# Patient Record
Sex: MALE | Race: WHITE
[De-identification: names, ages, dates, MRNs, and addresses within clinical notes are randomized per-mention and may not be internally consistent; named-entity substitution may affect disease eponyms.]

---

## 2022-04-04 PROBLEM — Z00.00 ENCOUNTER FOR PREVENTIVE HEALTH EXAMINATION: Status: ACTIVE | Noted: 2022-04-04

## 2022-04-05 ENCOUNTER — APPOINTMENT (OUTPATIENT)
Dept: ORTHOPEDIC SURGERY | Facility: CLINIC | Age: 62
End: 2022-04-05
Payer: OTHER MISCELLANEOUS

## 2022-04-05 VITALS — WEIGHT: 230 LBS | HEIGHT: 67 IN | BODY MASS INDEX: 36.1 KG/M2

## 2022-04-05 DIAGNOSIS — G40.909 EPILEPSY, UNSPECIFIED, NOT INTRACTABLE, W/OUT STATUS EPILEPTICUS: ICD-10-CM

## 2022-04-05 DIAGNOSIS — E78.00 PURE HYPERCHOLESTEROLEMIA, UNSPECIFIED: ICD-10-CM

## 2022-04-05 PROCEDURE — 20611 DRAIN/INJ JOINT/BURSA W/US: CPT | Mod: RT

## 2022-04-05 PROCEDURE — 99072 ADDL SUPL MATRL&STAF TM PHE: CPT

## 2022-04-05 RX ORDER — ATORVASTATIN CALCIUM 80 MG/1
TABLET, FILM COATED ORAL
Refills: 0 | Status: ACTIVE | COMMUNITY

## 2022-04-05 RX ORDER — LEVETIRACETAM 1000 MG/1
TABLET, FILM COATED ORAL
Refills: 0 | Status: ACTIVE | COMMUNITY

## 2022-04-05 RX ORDER — OLMESARTAN MEDOXOMIL 40 MG/1
TABLET, FILM COATED ORAL
Refills: 0 | Status: ACTIVE | COMMUNITY

## 2022-04-05 NOTE — HISTORY OF PRESENT ILLNESS
[de-identified] : Rt knee follow up  DOI 11/17/21. Starting series of Orthovisc possibly in the knee today. Still complaining of some pain with motion and getting up and down. CUrrently not working

## 2022-04-05 NOTE — DISCUSSION/SUMMARY
[de-identified] : rt knee post treauamtic arthitis  disuscced visco and would move forward w starting orthovisc today \par Patient presented for followup, exam is unchanged and doing well, evaluated and  no contraindication to proceed with orthovisc for arthritis, reviewed  future non surgical and surgical options for today and in future\par

## 2022-04-05 NOTE — PROCEDURE
[Large Joint Injection] : Large joint injection [Right] : of the right [Knee] : knee [Orthovisc] : Orthovisc [Inflammation] : inflammation

## 2022-04-12 ENCOUNTER — APPOINTMENT (OUTPATIENT)
Dept: ORTHOPEDIC SURGERY | Facility: CLINIC | Age: 62
End: 2022-04-12
Payer: OTHER MISCELLANEOUS

## 2022-04-12 VITALS — BODY MASS INDEX: 36.1 KG/M2 | HEIGHT: 67 IN | WEIGHT: 230 LBS

## 2022-04-12 DIAGNOSIS — Z86.79 PERSONAL HISTORY OF OTHER DISEASES OF THE CIRCULATORY SYSTEM: ICD-10-CM

## 2022-04-12 PROCEDURE — 99072 ADDL SUPL MATRL&STAF TM PHE: CPT

## 2022-04-12 PROCEDURE — 99212 OFFICE O/P EST SF 10 MIN: CPT | Mod: 25

## 2022-04-12 PROCEDURE — 20611 DRAIN/INJ JOINT/BURSA W/US: CPT | Mod: RT

## 2022-04-12 NOTE — PROCEDURE
[Right] : of the right [Knee] : knee [Orthovisc] : Orthovisc [#2] : series #2 [Precise injection in area of tear] : precise injection in area of tear

## 2022-04-12 NOTE — HISTORY OF PRESENT ILLNESS
[5] : 5 [Dull/Aching] : dull/aching [Sharp] : sharp [Frequent] : frequent [Rest] : rest [2] : 2 [de-identified] : rt knee no relief from last injeciton  \par has been out of work  [] : no [FreeTextEntry1] : right knee [de-identified] : motion  [de-identified] : 4/5/22 [TWNoteComboBox1] : 10%

## 2022-04-19 ENCOUNTER — APPOINTMENT (OUTPATIENT)
Dept: ORTHOPEDIC SURGERY | Facility: CLINIC | Age: 62
End: 2022-04-19
Payer: OTHER MISCELLANEOUS

## 2022-04-19 VITALS — WEIGHT: 230 LBS | HEIGHT: 67 IN | BODY MASS INDEX: 36.1 KG/M2

## 2022-04-19 PROCEDURE — 20611 DRAIN/INJ JOINT/BURSA W/US: CPT

## 2022-04-19 PROCEDURE — 99072 ADDL SUPL MATRL&STAF TM PHE: CPT

## 2022-04-19 PROCEDURE — 99213 OFFICE O/P EST LOW 20 MIN: CPT | Mod: 25

## 2022-04-19 NOTE — DISCUSSION/SUMMARY
[de-identified] : no reacitons will cont orthovisc and he is still out of work \par evaluated knee and decided to proceed with orthovisc injections, discussed future treatment and option, will proceed, discussed possible surgery vs alternatives in future\par The risks, benefits and contents of the injection have been discussed.  Risks include but are not limited to allergic reaction, flare reaction, permanent white skin discoloration at the injection site and infection.  The patient understands the risks and agrees to having the injection.  All questions have been answered.\par \par \par

## 2022-04-19 NOTE — PROCEDURE
[Right] : of the right [Knee] : knee [Pain] : pain [Inflammation] : inflammation [X-ray evidence of Osteoarthritis on this or prior visit] : x-ray evidence of Osteoarthritis on this or prior visit [Betadine] : betadine [Ethyl Chloride sprayed topically] : ethyl chloride sprayed topically [Sterile technique used] : sterile technique used [Orthovisc] : Orthovisc [#3] : series #3 [Precise injection in area of tear] : precise injection in area of tear [Large Joint Injection] : Large joint injection [Call if redness, pain or fever occur] : call if redness, pain or fever occur [Apply ice for 15min out of every hour for the next 12-24 hours as tolerated] : apply ice for 15 minutes out of every hour for the next 12-24 hours as tolerated [Previous OTC use and PT nontherapeutic] : patient has tried OTC's including aspirin, Ibuprofen, Aleve, etc or prescription NSAIDS, and/or exercises at home and/or physical therapy without satisfactory response [Patient had decreased mobility in the joint] : patient had decreased mobility in the joint [All ultrasound images have been permanently captured and stored accordingly in our picture archiving and communication system] : All ultrasound images have been permanently captured and stored accordingly in our picture archiving and communication system [Visualization of the needle and placement of injection was performed without complication] : visualization of the needle and placement of injection was performed without complication

## 2022-04-19 NOTE — HISTORY OF PRESENT ILLNESS
[5] : 5 [Dull/Aching] : dull/aching [Sharp] : sharp [Frequent] : frequent [Rest] : rest [2] : 2 [] : no [FreeTextEntry1] : right knee [de-identified] : motion  [de-identified] : 4/5/22 [TWNoteComboBox1] : 10% [de-identified] : rt knee no relief from last injeciton  \par has been out of work

## 2022-04-26 ENCOUNTER — APPOINTMENT (OUTPATIENT)
Dept: ORTHOPEDIC SURGERY | Facility: CLINIC | Age: 62
End: 2022-04-26
Payer: OTHER MISCELLANEOUS

## 2022-04-26 VITALS — WEIGHT: 230 LBS | HEIGHT: 67 IN | BODY MASS INDEX: 36.1 KG/M2

## 2022-04-26 PROCEDURE — 99072 ADDL SUPL MATRL&STAF TM PHE: CPT

## 2022-04-26 PROCEDURE — 20611 DRAIN/INJ JOINT/BURSA W/US: CPT

## 2022-04-26 PROCEDURE — 99213 OFFICE O/P EST LOW 20 MIN: CPT | Mod: 25

## 2022-04-26 NOTE — DATA REVIEWED
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

## 2022-04-26 NOTE — DISCUSSION/SUMMARY
[Medication Risks Reviewed] : Medication risks reviewed done [Surgical risks reviewed] : Surgical risks reviewed [de-identified] : The risks, benefits and contents of the injection have been discussed.  Risks include but are not limited to allergic reaction, flare reaction, permanent white skin discoloration at the injection site and infection.  The patient understands the risks and agrees to having the injection.  All questions have been answered.\par evaluated knee and decided to proceed with orthovisc injections, discussed future treatment and option, will proceed, discussed possible surgery vs alternatives in future\par The risks, benefits and contents of the injection have been discussed.  Risks include but are not limited to allergic reaction, flare reaction, permanent white skin discoloration at the injection site and infection.  The patient understands the risks and agrees to having the injection.  All questions have been answered.\par #4 Large joint injection was performed of the right The indication for this procedure was pain, inflammation and x-ray evidence of Osteoarthritis on this or prior visit. The site was prepped with betadine, ethyl chloride sprayed topically and sterile technique used. An injection of Orthovisc, series [ ] was used. \par Patient was advised to call if redness, pain or fever occur and apply ice for 15 minutes out of every hour for the next 12-24 hours as tolerated. \par \par Patient has tried OTC's including aspirin, Ibuprofen, Aleve, etc or prescription NSAIDS, and/or exercises at home and/or physical therapy without satisfactory response, patient had decreased mobility in the joint and the risks benefits, and alternatives have been discussed, and verbal consent was obtained. \par Ultrasound guidance was indicated for this patient due to altered anatomic landmarks d/t erosive arthritis. All ultrasound images have been permanently captured and stored accordingly in our picture archiving and communication system. Visualization of the needle and placement of injection was performed without complication.\par \par \par Patient has tried OTC's including aspirin, Ibuprofen, Aleve, etc or prescription NSAIDS, and/or exercises at home and/or physical therapy without satisfactory response, patient had decreased mobility in the joint and the risks benefits, and alternatives have been discussed, and verbal consent was obtained. \par \par \par discussed possible need for scope vs total if injecitons do not help\par he is out of work secondary to his back  and knee\par \par

## 2022-04-26 NOTE — HISTORY OF PRESENT ILLNESS
[3] : 3 [1] : 2 [Dull/Aching] : dull/aching [Intermittent] : intermittent [Rest] : rest [Walking] : walking [4] : 4 [] : yes [FreeTextEntry1] : right knee  [TWNoteComboBox1] : 30%

## 2022-04-26 NOTE — REASON FOR VISIT
[FreeTextEntry2] : pt is here for a fu visit of the right knee. pt had an injection on the right knee on the last visit. pt has noticed a slight improvement with injections.

## 2022-05-24 ENCOUNTER — APPOINTMENT (OUTPATIENT)
Dept: ORTHOPEDIC SURGERY | Facility: CLINIC | Age: 62
End: 2022-05-24
Payer: OTHER MISCELLANEOUS

## 2022-05-24 VITALS — BODY MASS INDEX: 36.1 KG/M2 | WEIGHT: 230 LBS | HEIGHT: 67 IN

## 2022-05-24 PROCEDURE — 99072 ADDL SUPL MATRL&STAF TM PHE: CPT

## 2022-05-24 PROCEDURE — 99215 OFFICE O/P EST HI 40 MIN: CPT

## 2022-05-24 NOTE — PHYSICAL EXAM
[Right] : right knee [5___] : hamstring 5[unfilled]/5 [Positive] : positive Mauricio [] : no deformities of quad tendon [TWNoteComboBox7] : flexion 125 degrees [de-identified] : extension 5 degrees

## 2022-05-24 NOTE — HISTORY OF PRESENT ILLNESS
[de-identified] : pt is here for wc follow up of right knee. pain in right knee has not been good. pt is going to PT, helping but pt feels soreness. patient is currently not working

## 2022-05-24 NOTE — DISCUSSION/SUMMARY
[Surgical risks reviewed] : Surgical risks reviewed [de-identified] : persistent pain , therapy not helping, treating conservatively since beginning of year with no improvement with visco, steroid and rehab and medication , joint space 60% preserved, meets comp guidelines for surgery\par \par Meniscal tear and arthritis  has been doing therapy for a month and has done injections but has not seen improvement \par Rec surgery needs authorization  does not need to cont therapy because it is not improving \par discussed risk of meniscectomy vs meniscal repair, about 25% retear rate with repair vs increased arthritis that is correlated with resection of meniscus, will always repair if possible, understand i cant help permanent chondral damage\par Discussed the risks of recurrent tears as well as risk of progression of occult or existing arthritis, avn or chondrolysis.\par \par We had an extensive discussion regarding surgical intervention including risk, benefits and alternatives.  The risks include, but are not limited to infection, bleeding, injury to small nerves and blood vessels, pain, stiffness, phlebitis, DVT and need for secondary procedures.  Preoperative, intraoperative and postoperative care were discussed and outlined to the patient as well.\par he did have raulito that said he should have a knee replacement as opposed to arthroscopy but i disagree, premature for replacement and large tear, 75% chance of improvement with scope and meets guidelines\par also having left knee pain request authorization for to add left knee to case as consequential injury , it was aggravated over the past few months as he favored left side  diagnosis=aggravation of pre existing arthritis\par \par

## 2022-06-28 ENCOUNTER — APPOINTMENT (OUTPATIENT)
Dept: ORTHOPEDIC SURGERY | Facility: CLINIC | Age: 62
End: 2022-06-28
Payer: OTHER MISCELLANEOUS

## 2022-06-28 VITALS — WEIGHT: 230 LBS | BODY MASS INDEX: 36.1 KG/M2 | HEIGHT: 67 IN

## 2022-06-28 DIAGNOSIS — M25.569 PAIN IN UNSPECIFIED KNEE: ICD-10-CM

## 2022-06-28 PROCEDURE — 99072 ADDL SUPL MATRL&STAF TM PHE: CPT

## 2022-06-28 PROCEDURE — 99214 OFFICE O/P EST MOD 30 MIN: CPT

## 2022-06-28 PROCEDURE — 73564 X-RAY EXAM KNEE 4 OR MORE: CPT | Mod: LT

## 2022-06-28 NOTE — HISTORY OF PRESENT ILLNESS
[de-identified] : Patient is here for  DOI: 11/17/21 follow up of bilateral knees. Pt has been seen for right knee but left knee has started getting progressively worse with activity. Pt has constant pain with left knee.\par Pt notes no improvement in right knee. Pt was denied for right knee surgery, going under medical review now.

## 2022-06-28 NOTE — PHYSICAL EXAM
[5___] : hamstring 5[unfilled]/5 [Positive] : positive Mauricio [Left] : left knee [Bilateral] : knee bilaterally [] : no calf tenderness [Mild tricompartmental OA medial narrowing] : Mild tricompartmental OA medial narrowing [TWNoteComboBox7] : flexion 125 degrees [de-identified] : extension 5 degrees

## 2022-06-28 NOTE — DISCUSSION/SUMMARY
[Medication Risks Reviewed] : Medication risks reviewed [Surgical risks reviewed] : Surgical risks reviewed [de-identified] : mixed picture of meniscal tear and arthritis, maintained joint space , good candidate to avoid knee replacement though risks given arthritis, raulito recommended knee replacement and premature for that and large morbidity, request authorization for surgery on right knee , left knee having increased pain, he says he has been limping on putting all his weight on left side an now that is bothering him, joint space narrowing on xray, recommend and request authorization to treat left knee as consequential injury

## 2022-08-09 ENCOUNTER — APPOINTMENT (OUTPATIENT)
Dept: ORTHOPEDIC SURGERY | Facility: CLINIC | Age: 62
End: 2022-08-09

## 2022-08-09 VITALS — WEIGHT: 230 LBS | HEIGHT: 67 IN | BODY MASS INDEX: 36.1 KG/M2

## 2022-08-09 PROCEDURE — 99072 ADDL SUPL MATRL&STAF TM PHE: CPT

## 2022-08-09 PROCEDURE — 99215 OFFICE O/P EST HI 40 MIN: CPT

## 2022-09-14 ENCOUNTER — FORM ENCOUNTER (OUTPATIENT)
Age: 62
End: 2022-09-14

## 2022-09-20 ENCOUNTER — APPOINTMENT (OUTPATIENT)
Dept: ORTHOPEDIC SURGERY | Facility: CLINIC | Age: 62
End: 2022-09-20

## 2022-09-20 VITALS — HEIGHT: 67 IN | WEIGHT: 230 LBS | BODY MASS INDEX: 36.1 KG/M2

## 2022-09-20 PROCEDURE — 99072 ADDL SUPL MATRL&STAF TM PHE: CPT | Mod: ACP

## 2022-09-20 PROCEDURE — 20611 DRAIN/INJ JOINT/BURSA W/US: CPT | Mod: ACP

## 2022-09-20 PROCEDURE — J3490M: CUSTOM | Mod: ACP

## 2022-09-20 PROCEDURE — 99215 OFFICE O/P EST HI 40 MIN: CPT | Mod: ACP,25

## 2022-09-20 NOTE — PROCEDURE
[Large Joint Injection] : Large joint injection [Left] : of the left [Knee] : knee [Inflammation] : inflammation [X-ray evidence of Osteoarthritis on this or prior visit] : x-ray evidence of Osteoarthritis on this or prior visit [Betadine] : betadine [Ethyl Chloride sprayed topically] : ethyl chloride sprayed topically [Sterile technique used] : sterile technique used [___ cc    3mg] :  Betamethasone (Celestone) ~Vcc of 3mg [___ cc    0.25%] : Bupivacaine (Marcaine) ~Vcc of 0.25%  [] : Patient tolerated procedure well [Previous OTC use and PT nontherapeutic] : patient has tried OTC's including aspirin, Ibuprofen, Aleve, etc or prescription NSAIDS, and/or exercises at home and/or physical therapy without satisfactory response [Risks, benefits, alternatives discussed / Verbal consent obtained] : the risks benefits, and alternatives have been discussed, and verbal consent was obtained [Precise injection in area of tear] : precise injection in area of tear [All ultrasound images have been permanently captured and stored accordingly in our picture archiving and communication system] : All ultrasound images have been permanently captured and stored accordingly in our picture archiving and communication system [Visualization of the needle and placement of injection was performed without complication] : visualization of the needle and placement of injection was performed without complication [Pain] : pain [Arthritis] : arthritis

## 2022-09-21 NOTE — PHYSICAL EXAM
[Bilateral] : knee bilaterally [5___] : hamstring 5[unfilled]/5 [Positive] : positive Mauricio [] : no calf tenderness [TWNoteComboBox7] : flexion 125 degrees [de-identified] : extension 5 degrees

## 2022-09-21 NOTE — HISTORY OF PRESENT ILLNESS
[de-identified] : patient is here for a wc follow up on both knees. patient came in with paperwork today from the independent dr that is recommending a knee replacement for the right knee. patient claims left knee is not as bad as right but it has been getting worse. patient claims walking and long periods of standing cause excess pain. patient is not currently working due to injury. DOI 11/17/21

## 2022-09-21 NOTE — WORK
[Total] : total [Does not reveal pre-existing condition(s) that may affect treatment/prognosis] : does not reveal pre-existing condition(s) that may affect treatment/prognosis [Can return to work without limitations on ______] : can return to work without limitations on [unfilled] [Bending/Twisting] : bending/twisting [Climbing stairs/Ladders] : climbing stairs/ladders [Kneeling] : kneeling [Walking] : walking [Pulling/Pushing] : pulling/pushing [Reaching overhead] : reaching overhead [Unknown at this time] : : unknown at this time [Patient] : patient [Can return to his/her at work activities with restrictions?] : Patient can return to his/her at work activities with restrictions [Rx may affect patient's ability to return to work, make patient drowsy, or other issue] : Rx may affect patient's ability to return to work, make patient drowsy, or other issue. [I provided the services listed above] :  I provided the services listed above. [FreeTextEntry1] : fair

## 2022-09-21 NOTE — DISCUSSION/SUMMARY
[Medication Risks Reviewed] : Medication risks reviewed [Surgical risks reviewed] : Surgical risks reviewed [de-identified] : discussed knee replacement surgery but due to the risks, patient is a better candidate for arthroscopic surgery. \par The risks and benefits of surgery have been discussed. Risks include but are not limited to bleeding, infection, reaction to anesthesia, injury to blood vessels and nerves, malunion, nonunion, DVT, PE, necessity of repeat surgery, chronic pain, loss of limb and death. The patient understands the risks and agrees with the surgical plan. All questions have been answered.\par will check back after speaking  \par discussed high mobidity of total knee replacement compared with low morbidity of arthroscopy  though arthroscopy probably only has a 70% chance of working still a better option, read raulito report but familiar with that doc and don’t believe he has done either surgery on a regular basis for years so don’t agree with his assessment and his views or personal, not correlated with current orthopaedic treatment\par \par follow up in 6-8 weeks

## 2022-09-21 NOTE — DISCUSSION/SUMMARY
[Medication Risks Reviewed] : Medication risks reviewed [Surgical risks reviewed] : Surgical risks reviewed [de-identified] : discussed risks of surgical treatment and nonsurgical treatment of arthritis, discussed risks of steroid injection plus or minus viscosupplementation, risks of zilretta and benefits, role of surgery and mri, risks and role of nsaids and side effects, benefits of therapy\par \par recommend injecting the left knee with celestone today to help relieve pain and inflammation\par The risks, benefits and contents of the injection have been discussed.  Risks include but are not limited to allergic reaction, flare reaction, permanent white skin discoloration at the injection site and infection.  The patient understands the risks and agrees to having the injection.  All questions have been answered.\par Celestone LT knee discussed the timing of the injections and the follow up needed, advised patient to ice the knee if sore. \par \par discussed potential surgery for the right knee that has a meniscal tear shown in old MRI from 11/29. \par The risks and benefits of surgery have been discussed. Risks include but are not limited to bleeding, infection, reaction to anesthesia, injury to blood vessels and nerves, malunion, nonunion, DVT, PE, necessity of repeat surgery, chronic pain, loss of limb and death. The patient understands the risks and agrees with the surgical plan. All questions have been answered.\par

## 2022-09-21 NOTE — PHYSICAL EXAM
[Bilateral] : knee bilaterally [5___] : hamstring 5[unfilled]/5 [Positive] : positive Mauricio [] : no calf tenderness [TWNoteComboBox7] : flexion 125 degrees [de-identified] : extension 5 degrees

## 2022-09-21 NOTE — HISTORY OF PRESENT ILLNESS
[de-identified] : patient is here for a   DOI  11/17/21 follow up visit of  bill knees.  the pain in both knees  has improved . pt has been taking  gabapentin   and it  has  been helping with the pain in both knees. pt ha slight swelling in bill knees. pt is currently not working

## 2022-11-17 ENCOUNTER — FORM ENCOUNTER (OUTPATIENT)
Age: 62
End: 2022-11-17

## 2022-12-06 ENCOUNTER — APPOINTMENT (OUTPATIENT)
Dept: ORTHOPEDIC SURGERY | Facility: CLINIC | Age: 62
End: 2022-12-06

## 2022-12-06 VITALS — BODY MASS INDEX: 36.1 KG/M2 | WEIGHT: 230 LBS | HEIGHT: 67 IN

## 2022-12-06 PROCEDURE — 99215 OFFICE O/P EST HI 40 MIN: CPT

## 2022-12-06 PROCEDURE — 99072 ADDL SUPL MATRL&STAF TM PHE: CPT

## 2022-12-06 NOTE — PHYSICAL EXAM
[Bilateral] : knee bilaterally [] : pain with flexion [5___] : hamstring 5[unfilled]/5 [Positive] : positive Mauricio [TWNoteComboBox7] : flexion 125 degrees [de-identified] : extension 5 degrees

## 2022-12-06 NOTE — DISCUSSION/SUMMARY
[Medication Risks Reviewed] : Medication risks reviewed [Surgical risks reviewed] : Surgical risks reviewed [de-identified] : will give a hkb fro functional support and prevent buckiling of right knee\par continue to believe he would benefit from arthroscopy on right knee as total knee would have a higher failure rate and complication at his age\par In addition requesting to treat left knee as consequential injury, he originally complained in june that from favoring and limiting weight bearing on his right that his left knee is bothering him, common occurrence with pathology he has on right knee\par Discussed the risks of recurrent tears as well as risk of progression of occult or existing arthritis, avn or chondrolysis. Discussed the process of arthroscopy including risk, benefits and alternatives.  The risks include, but are not limited to infection, bleeding, injury to small nerves and blood vessels, pain, stiffness, phlebitis, DVT and need for secondary procedures.  Preoperative, intraoperative and postoperative care were discussed and outlined to the patient as well.\par

## 2022-12-06 NOTE — HISTORY OF PRESENT ILLNESS
[de-identified] : patient is here for  DOI: 11/17/21 of bilateral knees .  the pain in bilateral knees has  not been great.  pt has been taking gabapentin for a herniated disc and has been helping with the pain in the bill knees. pt had a steroid injection in the left knee on 9/20/22  that relief the pain in the left knee for  only for a week. pt is currently not working. \par \par rt knee has failed all conservative treatmetn   has had denials on scope  had a recent  hearing\par left knee pending consequetnial   knee leif and gives out  had a injeciton a couple months ago helped for 2 weeks \par has been complaining of pain since june left knee  and worsening buckling \par \par

## 2023-01-17 ENCOUNTER — APPOINTMENT (OUTPATIENT)
Dept: ORTHOPEDIC SURGERY | Facility: CLINIC | Age: 63
End: 2023-01-17
Payer: OTHER MISCELLANEOUS

## 2023-01-17 VITALS — HEIGHT: 67 IN | BODY MASS INDEX: 36.1 KG/M2 | WEIGHT: 230 LBS

## 2023-01-17 PROCEDURE — 99214 OFFICE O/P EST MOD 30 MIN: CPT

## 2023-01-17 PROCEDURE — 99072 ADDL SUPL MATRL&STAF TM PHE: CPT

## 2023-01-18 NOTE — PHYSICAL EXAM
[Bilateral] : knee bilaterally [] : pain with flexion [5___] : hamstring 5[unfilled]/5 [Positive] : positive Mauricio [TWNoteComboBox7] : flexion 125 degrees [de-identified] : extension 5 degrees

## 2023-01-18 NOTE — DISCUSSION/SUMMARY
[Medication Risks Reviewed] : Medication risks reviewed [Surgical risks reviewed] : Surgical risks reviewed [de-identified] : still in pain with his right knee, i though best risk benefit option was arthroscopy, raulito recommended total knee replacement but risks and morbidty too high fo rhis level of disease and i believe he should try to avoid that and arthroscopy with possible post surgical injections has a good chance of improving his quality of life, await hearing on this\par his left knee has become progressively worse, he states that he is often limping and favors hit right knee by stepping down and putting all weight on left knee, requesting authorization to work up and treat left knee as consequential injury

## 2023-01-18 NOTE — HISTORY OF PRESENT ILLNESS
[de-identified] : Pt is here to follow up on bilateral knees. Pain has not improved for b/l knees. Notes both knees began buckling recently, and pain is most prevalent while using stairs. Swelling to b/l, but more swollen for right. Takes gabapentin for herniated discs, and finds it does alleviate pain for knees. Ices and heats when needed. Patient is currently not working.  DOI: 11/17/21.

## 2023-02-28 ENCOUNTER — APPOINTMENT (OUTPATIENT)
Dept: ORTHOPEDIC SURGERY | Facility: CLINIC | Age: 63
End: 2023-02-28
Payer: OTHER MISCELLANEOUS

## 2023-02-28 VITALS — BODY MASS INDEX: 36.1 KG/M2 | HEIGHT: 67 IN | WEIGHT: 230 LBS

## 2023-02-28 PROCEDURE — 99215 OFFICE O/P EST HI 40 MIN: CPT

## 2023-02-28 PROCEDURE — 99072 ADDL SUPL MATRL&STAF TM PHE: CPT

## 2023-02-28 RX ORDER — GABAPENTIN 600 MG/1
600 TABLET, COATED ORAL
Refills: 0 | Status: ACTIVE | COMMUNITY

## 2023-03-01 NOTE — DISCUSSION/SUMMARY
[Medication Risks Reviewed] : Medication risks reviewed [Surgical risks reviewed] : Surgical risks reviewed [de-identified] : Patient presents with persistent pain in his right knee. Re-reviewed prior X-Ray imaging and discussed risk benefits of knee replacement vs arthroscopic procedures. Discussed with the patient the risks and morbidity of total knee for this level of disease. The patient is currently on social security and will not be returning to work, therefore a knee replacement makes more sense of his current state. Patient will receive right knee 9/2 under [place] guidance. The patient has had persistent pain despite conservative treatment.\par \par his left knee has become progressively worse, he states that he is often limping and favors hit right knee by stepping down and putting all weight on left knee, requesting authorization to work up and treat left knee as consequential injury\par \par we discussed risks and benefits of knee replacement which raulito recommended vs arthroscopy which does have a significant failure rate given his arthritis but his joint space is maintained on xray and given much lower morbidity and young age it seems it is a better risk benefit alternative that may buy him a few years\par \par The risks, benefits and contents of the injection have been discussed.  Risks include but are not limited to allergic reaction, flare reaction, permanent white skin discoloration at the injection site and infection.  The patient understands the risks and agrees to having the injection.  All questions have been answered. \par The risks and benefits of surgery have been discussed. Risks include but are not limited to bleeding, infection, reaction to anesthesia, injury to blood vessels and nerves, malunion, nonunion, DVT, PE, necessity of repeat surgery, chronic pain, loss of limb and death. The patient understands the risks and agrees with the surgical plan. All questions have been answered.\par Discussed the risks of recurrent tears as well as risk of progression of occult or existing arthritis, avn or chondrolysis. Discussed the process of arthroscopy including risk, benefits and alternatives.  The risks include, but are not limited to infection, bleeding, injury to small nerves and blood vessels, pain, stiffness, phlebitis, DVT and need for secondary procedures.  Preoperative, intraoperative and postoperative care were discussed and outlined to the patient as well.\par he has notice of decision authorizing arthroscopy

## 2023-03-01 NOTE — PROCEDURE
[Large Joint Injection] : Large joint injection [Right] : of the right [Knee] : knee [FreeTextEntry1] : Right knee 9/2 under US  [FreeTextEntry3] : Large joint injection was performed of the left knee. An injection of Bupivacaine (Marcaine) cc of 0.5% was used Betamethasone (Celestone) cc of 6mg. \par Patient was advised to call if redness, pain or fever occur and apply ice for 15 minutes out of every hour for the next 12-24 hours as tolerated. \par \par Patient has tried OTC's including aspirin, Ibuprofen, Aleve, etc or prescription NSAIDS, and/or exercises at home and/or physical therapy without satisfactory response and the risks benefits, and alternatives have been discussed, and verbal consent was obtained. \par Ultrasound guidance was indicated for this patient due to precise injection in area of tear. All ultrasound images have been permanently captured and stored accordingly in our picture archiving and communication system. Visualization of the needle and placement of injection was performed without complication. \par The findings showed no evidence of ligament, tendon or muscle tear and no effusion or other fluid collection.

## 2023-03-01 NOTE — HISTORY OF PRESENT ILLNESS
[de-identified] : Patient is here for a worker's comp follow up appointment for bilateral knees, DOI: 11/17/21. Patient states pain has worsened since the previous visit. Patient notes both knees continue to buckle periodically. Patient notes pain is most prevalent when using stairs. Patient notes a new pain developed on the top of the right knee. Patient states knees still swelling. Patient continues to use ice and heat as needed. Patient is not currently working.

## 2023-03-01 NOTE — PHYSICAL EXAM
[Bilateral] : knee bilaterally [5___] : hamstring 5[unfilled]/5 [Positive] : positive Mauricio [] : no calf tenderness [TWNoteComboBox7] : flexion 125 degrees [de-identified] : extension 5 degrees

## 2023-03-13 ENCOUNTER — APPOINTMENT (OUTPATIENT)
Age: 63
End: 2023-03-13
Payer: OTHER MISCELLANEOUS

## 2023-03-13 PROCEDURE — 29879 ARTHRS KNE SRG ABRASJ ARTHRP: CPT | Mod: AS,59,RT

## 2023-03-13 PROCEDURE — 29879 ARTHRS KNE SRG ABRASJ ARTHRP: CPT | Mod: 59,RT

## 2023-03-13 PROCEDURE — 29875 ARTHRS KNEE SURG SYNVCT LMTD: CPT | Mod: AS,59,RT

## 2023-03-13 PROCEDURE — 29880 ARTHRS KNE SRG MNISECTMY M&L: CPT | Mod: AS,RT

## 2023-03-13 PROCEDURE — 29880 ARTHRS KNE SRG MNISECTMY M&L: CPT | Mod: RT

## 2023-03-13 PROCEDURE — 29875 ARTHRS KNEE SURG SYNVCT LMTD: CPT | Mod: 59,RT

## 2023-03-21 ENCOUNTER — APPOINTMENT (OUTPATIENT)
Dept: ORTHOPEDIC SURGERY | Facility: CLINIC | Age: 63
End: 2023-03-21
Payer: OTHER MISCELLANEOUS

## 2023-03-21 VITALS — BODY MASS INDEX: 36.1 KG/M2 | WEIGHT: 230 LBS | HEIGHT: 67 IN

## 2023-03-21 PROCEDURE — 99024 POSTOP FOLLOW-UP VISIT: CPT

## 2023-03-25 NOTE — DISCUSSION/SUMMARY
[Medication Risks Reviewed] : Medication risks reviewed [de-identified] : The patient is approximately 1 week s/p right knee medial and lateral meniscectomies (DOS: 3/13/23)- normal course with good progress and no evidence of infection. Incision sites are well approximated, clean, dry, intact, without drainage, without erythema. The patient is instructed in wound management. The patient's post-op plan, protocol and activity modifications have been thoroughly discussed and the patient expressed understanding. \par Start physical therapy\par Follow up 3-4 weeks.

## 2023-03-25 NOTE — HISTORY OF PRESENT ILLNESS
[de-identified] : Here for post op on the right knee from menisectomy on 3/13/23. Feeling well, using cane outside just to play it safe. Pain has decreased since surgery. No increased swelling or oozing from the area.

## 2023-04-25 ENCOUNTER — APPOINTMENT (OUTPATIENT)
Dept: ORTHOPEDIC SURGERY | Facility: CLINIC | Age: 63
End: 2023-04-25
Payer: OTHER MISCELLANEOUS

## 2023-04-25 VITALS — BODY MASS INDEX: 36.1 KG/M2 | WEIGHT: 230 LBS | HEIGHT: 67 IN

## 2023-04-25 PROCEDURE — 99024 POSTOP FOLLOW-UP VISIT: CPT

## 2023-04-25 RX ORDER — HYDROCODONE BITARTRATE AND ACETAMINOPHEN 10; 325 MG/1; MG/1
10-325 TABLET ORAL
Qty: 20 | Refills: 0 | Status: DISCONTINUED | COMMUNITY
Start: 2023-03-08 | End: 2023-04-25

## 2023-04-26 NOTE — HISTORY OF PRESENT ILLNESS
[de-identified] : Patient is here for a post op visit from s on 3/13/23 - right knee medial and lateral meniscectomy. [facility] Notes tenderness, otherwise doing well, improvement with walking. Patient is currently not working.  DOI: 11/7/21.

## 2023-04-26 NOTE — DISCUSSION/SUMMARY
[Medication Risks Reviewed] : Medication risks reviewed [de-identified] : \par The patient is approximately 6 weeks s/p right knee medial and lateral meniscectomies (DOS: 3/13/23)- normal course with good progress and no evidence of infection. Incision sites are healed. The patient is instructed in wound management. The patient's post-op plan, protocol and activity modifications have been thoroughly discussed and the patient expressed understanding. \par The patient is doing great, range of motion is improving. \par Continue physical therapy. \par Follow up 4 weeks.

## 2023-04-26 NOTE — PHYSICAL EXAM
[Right] : right knee [NL (0)] : extension 0 degrees [5___] : quadriceps 5[unfilled]/5 [] : no induration [TWNoteComboBox7] : flexion 130 degrees

## 2023-05-12 ENCOUNTER — FORM ENCOUNTER (OUTPATIENT)
Age: 63
End: 2023-05-12

## 2023-05-23 ENCOUNTER — NON-APPOINTMENT (OUTPATIENT)
Age: 63
End: 2023-05-23

## 2023-05-23 ENCOUNTER — APPOINTMENT (OUTPATIENT)
Dept: ORTHOPEDIC SURGERY | Facility: CLINIC | Age: 63
End: 2023-05-23
Payer: OTHER MISCELLANEOUS

## 2023-05-23 VITALS — WEIGHT: 230 LBS | BODY MASS INDEX: 36.1 KG/M2 | HEIGHT: 67 IN

## 2023-05-23 DIAGNOSIS — G40.909 EPILEPSY, UNSPECIFIED, NOT INTRACTABLE, W/OUT STATUS EPILEPTICUS: ICD-10-CM

## 2023-05-23 PROCEDURE — 99024 POSTOP FOLLOW-UP VISIT: CPT

## 2023-05-27 NOTE — HISTORY OF PRESENT ILLNESS
[de-identified] : Patient is here for a post op visit on the right knee. Patient notes overall he is seeing improvements. Patient notes [facility] ([facility]) is going well. Patient mentions he hasn’t tested the knee too much but he would like to get back on his bicycle at some point. 3/13/23 medial and lateral meniscectomies. WC DOI 11/17/21 Patient is not currently working due to injury. Patient notes he had an epidural about 3 weeks after knee surgery.

## 2023-05-27 NOTE — DISCUSSION/SUMMARY
[Medication Risks Reviewed] : Medication risks reviewed [de-identified] : \par The patient is approximately 2 months s/p right knee medial and lateral meniscectomies (DOS: 3/13/23)- normal course with good progress and no evidence of infection. Incision sites are healed. The patient is instructed in wound management. The patient's post-op plan, protocol and activity modifications have been thoroughly discussed and the patient expressed understanding. \par \par The patient is working on getting left knee added to WC case as consequential injury. The patient reports onset of left knee mechanical symptoms in [place] of [address]. The patients right knee was first addressed for symptoms 11/21. \par \par The patients right knee is doing great, range of motion is improving. \par Patient remains out of work. \par Continue physical therapy. \par Follow up 4 weeks.

## 2023-05-27 NOTE — PHYSICAL EXAM
[Right] : right knee [NL (0)] : extension 0 degrees [5___] : quadriceps 5[unfilled]/5 [] : non-antalgic [de-identified] : Ligamentously stable exam  [TWNoteComboBox7] : flexion 135 degrees

## 2023-05-29 ENCOUNTER — FORM ENCOUNTER (OUTPATIENT)
Age: 63
End: 2023-05-29

## 2023-06-09 ENCOUNTER — FORM ENCOUNTER (OUTPATIENT)
Age: 63
End: 2023-06-09

## 2023-06-27 ENCOUNTER — FORM ENCOUNTER (OUTPATIENT)
Age: 63
End: 2023-06-27

## 2023-07-11 ENCOUNTER — APPOINTMENT (OUTPATIENT)
Dept: ORTHOPEDIC SURGERY | Facility: CLINIC | Age: 63
End: 2023-07-11
Payer: OTHER MISCELLANEOUS

## 2023-07-11 VITALS — BODY MASS INDEX: 36.1 KG/M2 | HEIGHT: 67 IN | WEIGHT: 230 LBS

## 2023-07-11 DIAGNOSIS — Z95.828 PRESENCE OF OTHER VASCULAR IMPLANTS AND GRAFTS: ICD-10-CM

## 2023-07-11 DIAGNOSIS — I25.2 OLD MYOCARDIAL INFARCTION: ICD-10-CM

## 2023-07-11 PROCEDURE — 99213 OFFICE O/P EST LOW 20 MIN: CPT

## 2023-07-11 RX ORDER — ASPIRIN ENTERIC COATED TABLETS 81 MG 81 MG/1
81 TABLET, DELAYED RELEASE ORAL
Refills: 0 | Status: ACTIVE | COMMUNITY

## 2023-07-11 RX ORDER — METOPROLOL TARTRATE 25 MG/1
25 TABLET, FILM COATED ORAL
Refills: 0 | Status: ACTIVE | COMMUNITY

## 2023-07-11 RX ORDER — TICAGRELOR 90 MG/1
90 TABLET ORAL
Refills: 0 | Status: ACTIVE | COMMUNITY

## 2023-07-12 NOTE — PHYSICAL EXAM
[Right] : right knee [NL (0)] : extension 0 degrees [5___] : quadriceps 5[unfilled]/5 [] : non-antalgic [de-identified] : Ligamentously stable exam  [TWNoteComboBox7] : flexion 135 degrees

## 2023-07-12 NOTE — HISTORY OF PRESENT ILLNESS
[de-identified] : Pt is here a follow up of the right knee.  DOI: 11/17/21. Medial and Lateral meniscectomies 3/13/23. Notes an improvement in the right knee. Notes some stiffness in the right knee but no major pain or issues. [facility] ([place] which has been going well, did not go for one week due to waiting for  auth. Not taking anything for pain. Had recent heart attach on 6/7/23.\par Pt is not currently working due to the injury.

## 2023-07-12 NOTE — DISCUSSION/SUMMARY
[Medication Risks Reviewed] : Medication risks reviewed [de-identified] : \par \par  DOI: 11/17/21\par \par The patient is approximately 2 months s/p right knee medial and lateral meniscectomies (DOS: 3/13/23)- normal course with good progress and no evidence of infection. Incision sites are healed. The patient is instructed in wound management. The patient's post-op plan, protocol and activity modifications have been thoroughly discussed and the patient expressed understanding. \par \par The patient is working on getting left knee added to  case as consequential injury. The patient reports onset of left knee mechanical symptoms in [place] of [address]. The patients right knee was first addressed for symptoms 11/21. \par \par The patients right knee is doing great, range of motion is improving. \par Recent heart attack on 06/07/23\par The patient is not working at this time. \par Continue physical therapy. \par Follow up 6 weeks.

## 2023-07-23 ENCOUNTER — FORM ENCOUNTER (OUTPATIENT)
Age: 63
End: 2023-07-23

## 2023-08-22 ENCOUNTER — APPOINTMENT (OUTPATIENT)
Dept: ORTHOPEDIC SURGERY | Facility: CLINIC | Age: 63
End: 2023-08-22
Payer: OTHER MISCELLANEOUS

## 2023-08-22 VITALS — BODY MASS INDEX: 36.1 KG/M2 | WEIGHT: 230 LBS | HEIGHT: 67 IN

## 2023-08-22 PROCEDURE — 99214 OFFICE O/P EST MOD 30 MIN: CPT

## 2023-08-23 NOTE — DISCUSSION/SUMMARY
[Medication Risks Reviewed] : Medication risks reviewed [de-identified] :   DOI: 11/17/21  The patient is approximately 5 months s/p right knee medial and lateral meniscectomies (DOS: 3/13/23)- normal course with good progress and no evidence of infection. Incision sites are healed. The patient's post-op plan, protocol and activity modifications have been thoroughly discussed and the patient expressed understanding.  The patients right knee is doing great, range of motion is improving.   The patient is working on getting left knee added to  case as consequential injury. The patient reports onset of left knee mechanical symptoms in Spring of 2022, approximately 4-5 moths after right knee onset of symptoms. He reports ambulation of a limp. Preexisting OA LT knee resulted in a sift of weight and compensation caused his left knee to be symptomatic.   Recent heart attack on 06/07/23 The patient is not working at this time.  Continue physical therapy.  Follow up 2-3 months

## 2023-08-23 NOTE — HISTORY OF PRESENT ILLNESS
[de-identified] : / F/U DOI 11/17/21 injury to the right knee, surgery back in March medial menisectomy. Feeling some relief with the pain since last visit. Still concerns with bending and getting up and down. Currently attending PT, not working

## 2023-08-23 NOTE — PHYSICAL EXAM
[Right] : right knee [NL (0)] : extension 0 degrees [5___] : quadriceps 5[unfilled]/5 [] : non-antalgic [de-identified] : Ligamentously stable exam  [TWNoteComboBox7] : flexion 135 degrees

## 2023-10-03 ENCOUNTER — APPOINTMENT (OUTPATIENT)
Dept: ORTHOPEDIC SURGERY | Facility: CLINIC | Age: 63
End: 2023-10-03
Payer: OTHER MISCELLANEOUS

## 2023-10-03 VITALS — HEIGHT: 67 IN | BODY MASS INDEX: 36.1 KG/M2 | WEIGHT: 230 LBS

## 2023-10-03 PROCEDURE — 99215 OFFICE O/P EST HI 40 MIN: CPT | Mod: 25

## 2023-10-03 PROCEDURE — J3490M: CUSTOM

## 2023-10-03 PROCEDURE — 20611 DRAIN/INJ JOINT/BURSA W/US: CPT | Mod: LT

## 2023-10-03 PROCEDURE — 73564 X-RAY EXAM KNEE 4 OR MORE: CPT | Mod: LT

## 2023-10-09 ENCOUNTER — APPOINTMENT (OUTPATIENT)
Dept: MRI IMAGING | Facility: CLINIC | Age: 63
End: 2023-10-09
Payer: OTHER MISCELLANEOUS

## 2023-10-09 PROCEDURE — 73721 MRI JNT OF LWR EXTRE W/O DYE: CPT | Mod: LT

## 2023-10-17 ENCOUNTER — APPOINTMENT (OUTPATIENT)
Dept: ORTHOPEDIC SURGERY | Facility: CLINIC | Age: 63
End: 2023-10-17
Payer: OTHER MISCELLANEOUS

## 2023-10-17 VITALS — HEIGHT: 67 IN | WEIGHT: 230 LBS | BODY MASS INDEX: 36.1 KG/M2

## 2023-10-17 DIAGNOSIS — S83.272D COMPLEX TEAR OF LATERAL MENISCUS, CURRENT INJURY, LEFT KNEE, SUBSEQUENT ENCOUNTER: ICD-10-CM

## 2023-10-17 DIAGNOSIS — M65.9 SYNOVITIS AND TENOSYNOVITIS, UNSPECIFIED: ICD-10-CM

## 2023-10-17 PROCEDURE — 99215 OFFICE O/P EST HI 40 MIN: CPT

## 2023-10-23 PROBLEM — S83.272D COMPLEX TEAR OF LATERAL MENISCUS OF LEFT KNEE AS CURRENT INJURY, SUBSEQUENT ENCOUNTER: Status: ACTIVE | Noted: 2023-10-23

## 2023-10-23 PROBLEM — M65.9 SYNOVITIS: Status: ACTIVE | Noted: 2022-04-19

## 2023-10-24 ENCOUNTER — APPOINTMENT (OUTPATIENT)
Dept: ORTHOPEDIC SURGERY | Facility: CLINIC | Age: 63
End: 2023-10-24

## 2024-04-09 ENCOUNTER — APPOINTMENT (OUTPATIENT)
Dept: ORTHOPEDIC SURGERY | Facility: CLINIC | Age: 64
End: 2024-04-09
Payer: OTHER MISCELLANEOUS

## 2024-04-09 DIAGNOSIS — M17.12 UNILATERAL PRIMARY OSTEOARTHRITIS, LEFT KNEE: ICD-10-CM

## 2024-04-09 DIAGNOSIS — Z98.890 OTHER SPECIFIED POSTPROCEDURAL STATES: ICD-10-CM

## 2024-04-09 DIAGNOSIS — M17.11 UNILATERAL PRIMARY OSTEOARTHRITIS, RIGHT KNEE: ICD-10-CM

## 2024-04-09 DIAGNOSIS — S83.241D OTHER TEAR OF MEDIAL MENISCUS, CURRENT INJURY, RIGHT KNEE, SUBSEQUENT ENCOUNTER: ICD-10-CM

## 2024-04-09 DIAGNOSIS — M23.92 UNSPECIFIED INTERNAL DERANGEMENT OF LEFT KNEE: ICD-10-CM

## 2024-04-09 PROCEDURE — 99215 OFFICE O/P EST HI 40 MIN: CPT

## 2024-04-09 PROCEDURE — 73564 X-RAY EXAM KNEE 4 OR MORE: CPT | Mod: RT

## 2024-04-09 NOTE — WORK
[Torn Ligament/Tendon/Muscle] : torn ligament, tendon or muscle [Was the competent medical cause of the injury] : was the competent medical cause of the injury [Are consistent with the injury] : are consistent with the injury [Consistent with my objective findings] : consistent with my objective findings [Total (100%)] : total (100%) [Does not reveal pre-existing condition(s) that may affect treatment/prognosis] : does not reveal pre-existing condition(s) that may affect treatment/prognosis [Patient] : patient [Rx may affect patient's ability to return to work, make patient drowsy, or other issue] : Rx may affect patient's ability to return to work, make patient drowsy, or other issue. [I provided the services listed above] :  I provided the services listed above.

## 2024-04-14 PROBLEM — M23.92 ACUTE INTERNAL DERANGEMENT OF LEFT KNEE: Status: ACTIVE | Noted: 2022-06-28

## 2024-04-14 PROBLEM — M17.12 ARTHRITIS OF KNEE, LEFT: Status: ACTIVE | Noted: 2023-10-03

## 2024-04-14 PROBLEM — M17.11 PRIMARY OSTEOARTHRITIS OF RIGHT KNEE: Status: ACTIVE | Noted: 2022-04-05

## 2024-04-14 PROBLEM — S83.241D ACUTE MEDIAL MENISCUS TEAR OF RIGHT KNEE, SUBSEQUENT ENCOUNTER: Status: ACTIVE | Noted: 2022-05-24

## 2024-04-14 PROBLEM — Z98.890 S/P ARTHROSCOPY OF KNEE: Status: ACTIVE | Noted: 2023-03-21

## 2024-04-14 NOTE — PHYSICAL EXAM
[Right] : right knee [All Views] : anteroposterior, lateral, skyline, and anteroposterior standing [] : no erythema [de-identified] : Quad weakness  [FreeTextEntry9] : X-Ray right knee reveals evidence of moderate tricompartmental degenerative changes with associated spurs, previous patella Os that never fused. [TWNoteComboBox7] : flexion 120 degrees [de-identified] : extension 5 degrees

## 2024-04-14 NOTE — HISTORY OF PRESENT ILLNESS
[de-identified] : Patient is here for a worker's comp follow up appointment  for the right knee, DOI 11/17/21. Patient states pain has not improved since the previous visit. Patient notes pain is most prevalent when going up stairs. Patient notes knee will occasionally buckle when using stairs. Patient is not currently attending physical therapy. Patient is not currently working.

## 2024-04-14 NOTE — DISCUSSION/SUMMARY
[de-identified] : WC DOI: 11/17/21 X-Ray right knee reveals evidence of moderate tricompartmental degenerative changes with associated spurs, previous patella Os that never fused.  Reviewed the higher risks of total knee arthroplasty considering previous patella os that never fused.    The patient is approximately 13 months s/p right knee medial and lateral meniscectomies (DOS: 3/13/23) - normal course with good progress and no evidence of infection. Incision sites are healed. He had significant degenerative changed upon surgical intervention. Discussed risks of surgical treatment and nonsurgical treatment of arthritis, discussed risks of steroid injection plus or minus visco supplementation, risks of zilretta and benefits, role of surgery and MRI, risks and role of NSAIDs and side effects, benefits of therapy.   The patient has proven refractory to all previous conservative treatment modalities including but not limited to 6 weeks of formal physical therapy, meniscectomies, injection therapy, home exercises, activity modifications, oral anti-inflammatories such as Motrin, RICE, etc. His knee pain is worsening and now interfering with activities of daily living.   We had an extensive discussion regarding total knee replacement surgery intervention including risk, benefits and alternatives.  The risks include, but are not limited to infection, bleeding, injury to small nerves and blood vessels, pain, stiffness, phlebitis, DVT and need for secondary procedures.  there are people despite well done surgery who can lose their leg or life as a result of unforeseen complications. Preoperative, intraoperative and postoperative care were discussed and outlined to the patient as well.  I have also talked to the patient about the specific risks of computer assisted surgery and robotic surgery including the reported risk of malalignment, increased injury to neurovascular structures; ligamentous structure with minimally invasive computer assisted surgery as well as iatrogenic injury from the pin sites, risks of fracture around the pins or computer error.  In my opinion the benefits outweigh the risk.  The patient would like to proceed with this.   He will obtain a pre-operative CT scan right knee with 3D reconstruction.   He requires cardiac clearance secondary to stents.

## 2024-06-25 ENCOUNTER — APPOINTMENT (OUTPATIENT)
Dept: ORTHOPEDIC SURGERY | Facility: CLINIC | Age: 64
End: 2024-06-25
Payer: OTHER MISCELLANEOUS

## 2024-06-25 PROCEDURE — 99215 OFFICE O/P EST HI 40 MIN: CPT

## 2024-08-01 ENCOUNTER — APPOINTMENT (OUTPATIENT)
Dept: CT IMAGING | Facility: CLINIC | Age: 64
End: 2024-08-01
Payer: OTHER MISCELLANEOUS

## 2024-08-01 PROCEDURE — 73700 CT LOWER EXTREMITY W/O DYE: CPT | Mod: RT

## 2024-08-07 ENCOUNTER — OUTPATIENT (OUTPATIENT)
Dept: OUTPATIENT SERVICES | Facility: HOSPITAL | Age: 64
LOS: 1 days | Discharge: HOME | End: 2024-08-07

## 2024-08-07 VITALS
SYSTOLIC BLOOD PRESSURE: 115 MMHG | OXYGEN SATURATION: 99 % | HEART RATE: 73 BPM | RESPIRATION RATE: 17 BRPM | DIASTOLIC BLOOD PRESSURE: 70 MMHG | TEMPERATURE: 98 F | WEIGHT: 217.82 LBS | HEIGHT: 67 IN

## 2024-08-07 DIAGNOSIS — M17.11 UNILATERAL PRIMARY OSTEOARTHRITIS, RIGHT KNEE: ICD-10-CM

## 2024-08-07 DIAGNOSIS — Z98.890 OTHER SPECIFIED POSTPROCEDURAL STATES: Chronic | ICD-10-CM

## 2024-08-07 DIAGNOSIS — I25.10 ATHEROSCLEROTIC HEART DISEASE OF NATIVE CORONARY ARTERY WITHOUT ANGINA PECTORIS: ICD-10-CM

## 2024-08-07 DIAGNOSIS — I10 ESSENTIAL (PRIMARY) HYPERTENSION: ICD-10-CM

## 2024-08-07 DIAGNOSIS — G40.909 EPILEPSY, UNSPECIFIED, NOT INTRACTABLE, WITHOUT STATUS EPILEPTICUS: ICD-10-CM

## 2024-08-07 DIAGNOSIS — Z01.818 ENCOUNTER FOR OTHER PREPROCEDURAL EXAMINATION: ICD-10-CM

## 2024-08-07 DIAGNOSIS — Z98.61 CORONARY ANGIOPLASTY STATUS: Chronic | ICD-10-CM

## 2024-08-07 LAB
A1C WITH ESTIMATED AVERAGE GLUCOSE RESULT: 5.5 % — SIGNIFICANT CHANGE UP (ref 4–5.6)
ALBUMIN SERPL ELPH-MCNC: 4 G/DL — SIGNIFICANT CHANGE UP (ref 3.3–5)
ALP SERPL-CCNC: 74 U/L — SIGNIFICANT CHANGE UP (ref 40–120)
ALT FLD-CCNC: 60 U/L — SIGNIFICANT CHANGE UP (ref 12–78)
ANION GAP SERPL CALC-SCNC: 5 MMOL/L — SIGNIFICANT CHANGE UP (ref 5–17)
APTT BLD: 34.2 SEC — SIGNIFICANT CHANGE UP (ref 24.5–35.6)
AST SERPL-CCNC: 23 U/L — SIGNIFICANT CHANGE UP (ref 15–37)
BASOPHILS # BLD AUTO: 0.05 K/UL — SIGNIFICANT CHANGE UP (ref 0–0.2)
BASOPHILS NFR BLD AUTO: 0.7 % — SIGNIFICANT CHANGE UP (ref 0–2)
BILIRUB SERPL-MCNC: 0.5 MG/DL — SIGNIFICANT CHANGE UP (ref 0.2–1.2)
BLD GP AB SCN SERPL QL: SIGNIFICANT CHANGE UP
BUN SERPL-MCNC: 12 MG/DL — SIGNIFICANT CHANGE UP (ref 7–23)
CALCIUM SERPL-MCNC: 9.1 MG/DL — SIGNIFICANT CHANGE UP (ref 8.5–10.1)
CHLORIDE SERPL-SCNC: 103 MMOL/L — SIGNIFICANT CHANGE UP (ref 96–108)
CO2 SERPL-SCNC: 27 MMOL/L — SIGNIFICANT CHANGE UP (ref 22–31)
CREAT SERPL-MCNC: 1.16 MG/DL — SIGNIFICANT CHANGE UP (ref 0.5–1.3)
EGFR: 71 ML/MIN/1.73M2 — SIGNIFICANT CHANGE UP
EOSINOPHIL # BLD AUTO: 0.16 K/UL — SIGNIFICANT CHANGE UP (ref 0–0.5)
EOSINOPHIL NFR BLD AUTO: 2.1 % — SIGNIFICANT CHANGE UP (ref 0–6)
ESTIMATED AVERAGE GLUCOSE: 111 MG/DL — SIGNIFICANT CHANGE UP (ref 68–114)
GLUCOSE SERPL-MCNC: 138 MG/DL — HIGH (ref 70–99)
HCT VFR BLD CALC: 45.1 % — SIGNIFICANT CHANGE UP (ref 39–50)
HGB BLD-MCNC: 15.3 G/DL — SIGNIFICANT CHANGE UP (ref 13–17)
IMM GRANULOCYTES NFR BLD AUTO: 0.9 % — SIGNIFICANT CHANGE UP (ref 0–0.9)
INR BLD: 1.03 RATIO — SIGNIFICANT CHANGE UP (ref 0.85–1.18)
LYMPHOCYTES # BLD AUTO: 0.82 K/UL — LOW (ref 1–3.3)
LYMPHOCYTES # BLD AUTO: 10.7 % — LOW (ref 13–44)
MCHC RBC-ENTMCNC: 30.3 PG — SIGNIFICANT CHANGE UP (ref 27–34)
MCHC RBC-ENTMCNC: 33.9 G/DL — SIGNIFICANT CHANGE UP (ref 32–36)
MCV RBC AUTO: 89.3 FL — SIGNIFICANT CHANGE UP (ref 80–100)
MONOCYTES # BLD AUTO: 0.98 K/UL — HIGH (ref 0–0.9)
MONOCYTES NFR BLD AUTO: 12.8 % — SIGNIFICANT CHANGE UP (ref 2–14)
NEUTROPHILS # BLD AUTO: 5.57 K/UL — SIGNIFICANT CHANGE UP (ref 1.8–7.4)
NEUTROPHILS NFR BLD AUTO: 72.8 % — SIGNIFICANT CHANGE UP (ref 43–77)
NRBC # BLD: 0 /100 WBCS — SIGNIFICANT CHANGE UP (ref 0–0)
PLATELET # BLD AUTO: 204 K/UL — SIGNIFICANT CHANGE UP (ref 150–400)
POTASSIUM SERPL-MCNC: 3.8 MMOL/L — SIGNIFICANT CHANGE UP (ref 3.5–5.3)
POTASSIUM SERPL-SCNC: 3.8 MMOL/L — SIGNIFICANT CHANGE UP (ref 3.5–5.3)
PROT SERPL-MCNC: 7.7 GM/DL — SIGNIFICANT CHANGE UP (ref 6–8.3)
PROTHROM AB SERPL-ACNC: 12.4 SEC — SIGNIFICANT CHANGE UP (ref 9.5–13)
RBC # BLD: 5.05 M/UL — SIGNIFICANT CHANGE UP (ref 4.2–5.8)
RBC # FLD: 13 % — SIGNIFICANT CHANGE UP (ref 10.3–14.5)
SODIUM SERPL-SCNC: 135 MMOL/L — SIGNIFICANT CHANGE UP (ref 135–145)
WBC # BLD: 7.65 K/UL — SIGNIFICANT CHANGE UP (ref 3.8–10.5)
WBC # FLD AUTO: 7.65 K/UL — SIGNIFICANT CHANGE UP (ref 3.8–10.5)

## 2024-08-07 NOTE — OCCUPATIONAL THERAPY INITIAL EVALUATION ADULT - GENERAL OBSERVATIONS, REHAB EVAL
Chart reviewed. Patient encountered seated in chair in rehab preop room in [facility]. Patient underwent occupational therapy pre-operative consultation to determine current functional ADL limitations in order to provide the right equipment for patient to perform functional ADL post operation.

## 2024-08-07 NOTE — H&P PST ADULT - NSICDXPASTSURGICALHX_GEN_ALL_CORE_FT
PAST SURGICAL HISTORY:  H/O arthroscopy of right knee     History of PTCA 1     S/P arthroscopy of right shoulder

## 2024-08-07 NOTE — H&P PST ADULT - PROBLEM SELECTOR PLAN 1
robotic assisted right total knee arthroplasty with tami  labs - cbc,pt/ptt,bmp,t&s,nose cx,ekg  M/C required  cardiac clearance  neurology clearance   preop 3 day hibiclens instruction reviewed and given .instructed on if  nose cx positive use mupuricin 5 days and checklist given  take routine meds DOS with sips of water. avoid NSAID and OTC supplements. verbalized understanding  information on proper nutrition , increase protein and better food choices provided in packet   ensure clear given  Anesthesiologist to review PST labs, EKG, required clearances and optimization for surgery.

## 2024-08-07 NOTE — H&P PST ADULT - NSICDXPASTMEDICALHX_GEN_ALL_CORE_FT
PAST MEDICAL HISTORY:  CAD (coronary artery disease)     HLD (hyperlipidemia)     HTN (hypertension)     MVP (mitral valve prolapse)     Seizure disorder

## 2024-08-07 NOTE — H&P PST ADULT - HISTORY OF PRESENT ILLNESS
64M pmh htn, seizure d/o (last seizure 25 years ago on Keppra), CAD (s/p ptca w/stent x1 6-2023 on Brilinta), c/o right knee pain after injury while at work on 11- here for PST for scheduled robotic assisted right total knee arthroplasty with dr. Gilbert on 8-

## 2024-08-07 NOTE — OCCUPATIONAL THERAPY INITIAL EVALUATION ADULT - LIVES WITH, PROFILE
in a private house with 3 entry steps with no hand rails that cannot be reached simultaneously. Once inside, pt has to negotiate a flight of stairs with 15 steps, left ascending handrail to access the bedroom and bathroom.  The home is equipped with 2 bathrooms one and each level. The bathroom upstairs has walk in shower combination, fixed shower and standard toilet.  The bathroom at the main level has a tub/shower combination, grab bars, fixed / retractable shower head and comfort height toilet with adequate space to fit a commode over it./spouse in a private house with 3 entry steps with no hand rails that cannot be reached simultaneously. Once inside, pt has to negotiate a flight of stairs with 15 steps, left ascending handrail to access the bedroom and bathroom. The home is equipped with 2 bathrooms one located on each level. The bathroom upstairs has walk in shower combination, fixed shower and standard toilet. The bathroom at the main level has a tub/shower combination, grab bars, fixed / retractable shower head and comfort height toilet with adequate space to fit a commode over it./spouse

## 2024-08-07 NOTE — H&P PST ADULT - PROBLEM SELECTOR PLAN 2
Last seizure 25 years ago  on Keppra  Continue current regimen and medications.   neurology clearance

## 2024-08-07 NOTE — H&P PST ADULT - ASSESSMENT
64M pmh htn, seizure d/o (last seizure 25 years ago on Keppra), CAD (s/p ptca w/stent x1  on Brilinta), c/o right knee pain after injury while at work on 2021 here for PST for scheduled robotic assisted right total knee arthroplasty with dr. Gilbert on 2024   CAPRINI SCORE    AGE RELATED RISK FACTORS                                                             [ ] Age 41-60 years                                            (1 Point)  [ x] Age: 61-74 years                                           (2 Points)                 [ ] Age= 75 years                                                (3 Points)             DISEASE RELATED RISK FACTORS                                                       [ ] Edema in the lower extremities                 (1 Point)                     [ ] Varicose veins                                               (1 Point)                                 [ xBMI > 25 Kg/m2                                            (1 Point)                                  [ ] Serious infection (ie PNA)                            (1 Point)                     [ ] Lung disease ( COPD, Emphysema)            (1 Point)                                                                          [ ] Acute myocardial infarction                         (1 Point)                  [ ] Congestive heart failure (in the previous month)  (1 Point)         [ ] Inflammatory bowel disease                            (1 Point)                  [ ] Central venous access, PICC or Port               (2 points)       (within the last month)                                                                [ ] Stroke (in the previous month)                        (5 Points)    [ ] Previous or present malignancy                       (2 points)                                                                                                                                                         HEMATOLOGY RELATED FACTORS                                                         [ ] Prior episodes of VTE                                     (3 Points)                     [ ] Positive family history for VTE                      (3 Points)                  [ ] Prothrombin 05091 A                                     (3 Points)                     [ ] Factor V Leiden                                                (3 Points)                        [ ] Lupus anticoagulants                                      (3 Points)                                                           [ ] Anticardiolipin antibodies                              (3 Points)                                                       [ ] High homocysteine in the blood                   (3 Points)                                             [ ] Other congenital or acquired thrombophilia      (3 Points)                                                [ ] Heparin induced thrombocytopenia                  (3 Points)                                        MOBILITY RELATED FACTORS  [ ] Bed rest                                                         (1 Point)  [ ] Plaster cast                                                    (2 points)  [ ] Bed bound for more than 72 hours           (2 Points)    GENDER SPECIFIC FACTORS  [ ] Pregnancy or had a baby within the last month   (1 Point)  [ ] Post-partum < 6 weeks                                   (1 Point)  [ ] Hormonal therapy  or oral contraception   (1 Point)  [ ] History of pregnancy complications              (1 point)  [ ] Unexplained or recurrent              (1 Point)    OTHER RISK FACTORS                                           (1 Point)  [ ] BMI >40, smoking, diabetes requiring insulin, chemotherapy  blood transfusions and length of surgery over 2 hours    SURGERY RELATED RISK FACTORS  [ ]  Section within the last month     (1 Point)  [ ] Minor surgery                                                  (1 Point)  [ ] Arthroscopic surgery                                       (2 Points)  [ ] Planned major surgery lasting more            (2 Points)      than 45 minutes     [x ] Elective hip or knee joint replacement       (5 points)       surgery                                                TRAUMA RELATED RISK FACTORS  [ ] Fracture of the hip, pelvis, or leg                       (5 Points)  [ ] Spinal cord injury resulting in paralysis             (5 points)       (in the previous month)    [ ] Paralysis  (less than 1 month)                             (5 Points)  [ ] Multiple Trauma within 1 month                        (5 Points)    Total Score [    8    ]    Caprini Score 0-2: Low Risk, NO VTE prophylaxis required for most patients, encourage ambulation  Caprini Score 3-6: Moderate Risk , pharmacologic VTE prophylaxis is indicated for most patients (in the absence of contraindications)  Caprini Score Greater than or =7: High risk, pharmocologic VTE prophylaxis indicated for most patients (in the absence of contraindications)

## 2024-08-07 NOTE — OCCUPATIONAL THERAPY INITIAL EVALUATION ADULT - PERTINENT HX OF CURRENT PROBLEM, REHAB EVAL
Pt is a 63 y/o male slated for elective surgery for right TKR with MD Gilbert , due to OA, pain , work related injuries in 2021 and DJD. Pt reported h/o herniated disc s/p heart attack in June 2023. Pt is a 65 y/o male slated for elective surgery for right TKR with MD Gilbert , due to OA, pain , work related injuries in 2021 and DJD. Pt reported h/o herniated disc and s/p heart attack in June 2023.

## 2024-08-07 NOTE — OCCUPATIONAL THERAPY INITIAL EVALUATION ADULT - RANGE OF MOTION EXAMINATION, LOWER EXTREMITY
ROM is limited in right knee due to pain/Right LE Active ROM was WFL   (within functional limits)/Right LE Passive ROM was WFL  (within functional limits)

## 2024-08-07 NOTE — OCCUPATIONAL THERAPY INITIAL EVALUATION ADULT - PRECAUTIONS/LIMITATIONS, REHAB EVAL
Pt's mobility and ADL management is limited due to pain in right knee ./cardiac precautions/fall precautions

## 2024-08-07 NOTE — OCCUPATIONAL THERAPY INITIAL EVALUATION ADULT - ADDITIONAL COMMENTS
At this time, pt is functioning in his roles, self sufficient, driving & ambulating independently at home without any assistive devices, but utilizes bilateral knee hinge braces in the community.  Pt has an antalgic gait. Pt c/o 6/10 pain in his right knee  at rest and 10/10 at worst. The pain is exacerbated, by walking, prolonged standing, negotiating steps and is relieved with ice and heat. Pt denied any adverse reactions to medication. Pt is right hand dominant and wears glasses  all the time. Dexterity and fine motor skills are diminished in both hands due to arthritic changes., At this time, pt is functioning in his roles, self sufficient, driving & ambulating independently at home without any assistive devices, but utilizes bilateral knee hinge braces in the community.  Pt has an antalgic gait. Pt c/o 6/10 pain in his right knee at rest and 10/10 at worst. The pain is exacerbated, by walking, prolonged standing, negotiating steps and is relieved with ice and heat. Pt denied any adverse reactions to medication. Pt is right hand dominant and wears glasses  all the time. Dexterity and fine motor skills are diminished in both hands due to arthritic changes.,

## 2024-08-07 NOTE — H&P PST ADULT - PROBLEM SELECTOR PLAN 3
s/p ptca w/stent x1 to RCA 6-2023  on Brilinta  Brilinta to be stopped 5 days prior to surgery instructed to confirm with cardiologist  cardiac clearance

## 2024-08-07 NOTE — OCCUPATIONAL THERAPY INITIAL EVALUATION ADULT - NSOTDMEREC_GEN_A_CORE
Pt needs a rolling walker to safely perform mobility related Activities of Daily Living./rolling walker/axillary crutches/dressing/toileting

## 2024-08-08 ENCOUNTER — TRANSCRIPTION ENCOUNTER (OUTPATIENT)
Age: 64
End: 2024-08-08

## 2024-08-08 LAB
MRSA PCR RESULT.: SIGNIFICANT CHANGE UP
S AUREUS DNA NOSE QL NAA+PROBE: DETECTED
VIT D25+D1,25 OH+D1,25 PNL SERPL-MCNC: 82.3 PG/ML — HIGH (ref 19.9–79.3)

## 2024-08-08 RX ORDER — MUPIROCIN CALCIUM 20 MG/G
1 CREAM TOPICAL
Qty: 1 | Refills: 0
Start: 2024-08-08 | End: 2024-08-12

## 2024-08-09 LAB — LEVETIRACETAM SERPL-MCNC: 16 UG/ML — SIGNIFICANT CHANGE UP (ref 10–40)

## 2024-08-23 PROBLEM — I34.1 NONRHEUMATIC MITRAL (VALVE) PROLAPSE: Chronic | Status: ACTIVE | Noted: 2024-08-07

## 2024-08-29 RX ORDER — CELECOXIB 50 MG/1
200 CAPSULE ORAL EVERY 12 HOURS
Refills: 0 | Status: DISCONTINUED | OUTPATIENT
Start: 2024-08-31 | End: 2024-08-31

## 2024-08-29 RX ORDER — TICAGRELOR 90 MG/1
90 TABLET ORAL EVERY 12 HOURS
Refills: 0 | Status: DISCONTINUED | OUTPATIENT
Start: 2024-08-31 | End: 2024-08-31

## 2024-08-29 RX ORDER — ACETAMINOPHEN 500 MG/5ML
1000 LIQUID (ML) ORAL EVERY 8 HOURS
Refills: 0 | Status: DISCONTINUED | OUTPATIENT
Start: 2024-08-30 | End: 2024-08-31

## 2024-08-29 RX ORDER — SENNA 187 MG
2 TABLET ORAL AT BEDTIME
Refills: 0 | Status: DISCONTINUED | OUTPATIENT
Start: 2024-08-30 | End: 2024-08-31

## 2024-08-29 RX ORDER — B1/B2/B3/B5/B6/B12/VIT C/FOLIC 500-0.5 MG
1 TABLET ORAL DAILY
Refills: 0 | Status: DISCONTINUED | OUTPATIENT
Start: 2024-08-30 | End: 2024-08-31

## 2024-08-29 RX ORDER — BISACODYL 5 MG
10 TABLET, DELAYED RELEASE (ENTERIC COATED) ORAL ONCE
Refills: 0 | Status: DISCONTINUED | OUTPATIENT
Start: 2024-09-01 | End: 2024-08-31

## 2024-08-29 RX ORDER — ACETAMINOPHEN 500 MG/5ML
1000 LIQUID (ML) ORAL ONCE
Refills: 0 | Status: COMPLETED | OUTPATIENT
Start: 2024-08-30 | End: 2024-08-30

## 2024-08-29 RX ORDER — POLYETHYLENE GLYCOL 3350 17 G/17G
17 POWDER, FOR SOLUTION ORAL AT BEDTIME
Refills: 0 | Status: DISCONTINUED | OUTPATIENT
Start: 2024-08-30 | End: 2024-08-31

## 2024-08-29 RX ORDER — ONDANSETRON HCL/PF 4 MG/2 ML
4 VIAL (ML) INJECTION EVERY 6 HOURS
Refills: 0 | Status: DISCONTINUED | OUTPATIENT
Start: 2024-08-30 | End: 2024-08-31

## 2024-08-29 RX ORDER — MELATONIN 5 MG
3 TABLET ORAL AT BEDTIME
Refills: 0 | Status: DISCONTINUED | OUTPATIENT
Start: 2024-08-30 | End: 2024-08-31

## 2024-08-29 RX ORDER — MAGNESIUM HYDROXIDE 400 MG/5ML
30 SUSPENSION ORAL DAILY
Refills: 0 | Status: DISCONTINUED | OUTPATIENT
Start: 2024-08-30 | End: 2024-08-31

## 2024-08-29 RX ORDER — GABAPENTIN 400 MG/1
300 CAPSULE ORAL THREE TIMES A DAY
Refills: 0 | Status: DISCONTINUED | OUTPATIENT
Start: 2024-08-30 | End: 2024-08-31

## 2024-08-29 RX ORDER — DEXAMETHASONE 0.5 MG/1
10 TABLET ORAL ONCE
Refills: 0 | Status: COMPLETED | OUTPATIENT
Start: 2024-08-31 | End: 2024-08-31

## 2024-08-29 RX ORDER — OXYCODONE HYDROCHLORIDE 30 MG/1
5 TABLET ORAL
Refills: 0 | Status: DISCONTINUED | OUTPATIENT
Start: 2024-08-30 | End: 2024-08-31

## 2024-08-29 RX ORDER — ATORVASTATIN CALCIUM 80 MG/1
20 TABLET, FILM COATED ORAL AT BEDTIME
Refills: 0 | Status: DISCONTINUED | OUTPATIENT
Start: 2024-08-30 | End: 2024-08-31

## 2024-08-29 RX ORDER — TRAMADOL HYDROCHLORIDE 50 MG/1
50 TABLET, FILM COATED ORAL EVERY 6 HOURS
Refills: 0 | Status: DISCONTINUED | OUTPATIENT
Start: 2024-08-30 | End: 2024-08-31

## 2024-08-29 RX ORDER — OXYCODONE HYDROCHLORIDE 30 MG/1
10 TABLET ORAL
Refills: 0 | Status: DISCONTINUED | OUTPATIENT
Start: 2024-08-30 | End: 2024-08-31

## 2024-08-29 RX ORDER — LOSARTAN POTASSIUM 100 MG/1
25 TABLET, FILM COATED ORAL DAILY
Refills: 0 | Status: DISCONTINUED | OUTPATIENT
Start: 2024-08-30 | End: 2024-08-31

## 2024-08-29 RX ORDER — METOPROLOL SUCCINATE 50 MG/1
25 TABLET, EXTENDED RELEASE ORAL DAILY
Refills: 0 | Status: DISCONTINUED | OUTPATIENT
Start: 2024-08-30 | End: 2024-08-31

## 2024-08-29 RX ORDER — LEVETIRACETAM 10 MG/ML
500 INJECTION, SOLUTION INTRAVENOUS
Refills: 0 | Status: DISCONTINUED | OUTPATIENT
Start: 2024-08-30 | End: 2024-08-31

## 2024-08-30 ENCOUNTER — APPOINTMENT (OUTPATIENT)
Dept: ORTHOPEDIC SURGERY | Facility: HOSPITAL | Age: 64
End: 2024-08-30
Payer: OTHER MISCELLANEOUS

## 2024-08-30 ENCOUNTER — INPATIENT (INPATIENT)
Facility: HOSPITAL | Age: 64
LOS: 0 days | Discharge: HOME HEALTH | End: 2024-08-31
Attending: ORTHOPAEDIC SURGERY | Admitting: ORTHOPAEDIC SURGERY
Payer: OTHER MISCELLANEOUS

## 2024-08-30 VITALS
HEART RATE: 63 BPM | OXYGEN SATURATION: 98 % | TEMPERATURE: 98 F | DIASTOLIC BLOOD PRESSURE: 93 MMHG | RESPIRATION RATE: 17 BRPM | WEIGHT: 218.04 LBS | SYSTOLIC BLOOD PRESSURE: 127 MMHG | HEIGHT: 67 IN

## 2024-08-30 DIAGNOSIS — Z98.61 CORONARY ANGIOPLASTY STATUS: Chronic | ICD-10-CM

## 2024-08-30 DIAGNOSIS — Z98.890 OTHER SPECIFIED POSTPROCEDURAL STATES: Chronic | ICD-10-CM

## 2024-08-30 PROBLEM — I10 ESSENTIAL (PRIMARY) HYPERTENSION: Chronic | Status: ACTIVE | Noted: 2024-08-07

## 2024-08-30 PROBLEM — I25.10 ATHEROSCLEROTIC HEART DISEASE OF NATIVE CORONARY ARTERY WITHOUT ANGINA PECTORIS: Chronic | Status: ACTIVE | Noted: 2024-08-07

## 2024-08-30 PROBLEM — G40.909 EPILEPSY, UNSPECIFIED, NOT INTRACTABLE, WITHOUT STATUS EPILEPTICUS: Chronic | Status: ACTIVE | Noted: 2024-08-07

## 2024-08-30 PROBLEM — E78.5 HYPERLIPIDEMIA, UNSPECIFIED: Chronic | Status: ACTIVE | Noted: 2024-08-07

## 2024-08-30 LAB
ANION GAP SERPL CALC-SCNC: 3 MMOL/L — LOW (ref 5–17)
BUN SERPL-MCNC: 17 MG/DL — SIGNIFICANT CHANGE UP (ref 7–23)
CALCIUM SERPL-MCNC: 8.7 MG/DL — SIGNIFICANT CHANGE UP (ref 8.5–10.1)
CHLORIDE SERPL-SCNC: 108 MMOL/L — SIGNIFICANT CHANGE UP (ref 96–108)
CO2 SERPL-SCNC: 26 MMOL/L — SIGNIFICANT CHANGE UP (ref 22–31)
CREAT SERPL-MCNC: 0.89 MG/DL — SIGNIFICANT CHANGE UP (ref 0.5–1.3)
EGFR: 97 ML/MIN/1.73M2 — SIGNIFICANT CHANGE UP
EGFR: 97 ML/MIN/1.73M2 — SIGNIFICANT CHANGE UP
GLUCOSE BLDC GLUCOMTR-MCNC: 111 MG/DL — HIGH (ref 70–99)
GLUCOSE SERPL-MCNC: 128 MG/DL — HIGH (ref 70–99)
HCT VFR BLD CALC: 40.7 % — SIGNIFICANT CHANGE UP (ref 39–50)
HGB BLD-MCNC: 13.7 G/DL — SIGNIFICANT CHANGE UP (ref 13–17)
MCHC RBC-ENTMCNC: 30.6 PG — SIGNIFICANT CHANGE UP (ref 27–34)
MCHC RBC-ENTMCNC: 33.7 G/DL — SIGNIFICANT CHANGE UP (ref 32–36)
MCV RBC AUTO: 91.1 FL — SIGNIFICANT CHANGE UP (ref 80–100)
NRBC # BLD: 0 /100 WBCS — SIGNIFICANT CHANGE UP (ref 0–0)
NRBC BLD-RTO: 0 /100 WBCS — SIGNIFICANT CHANGE UP (ref 0–0)
PLATELET # BLD AUTO: 173 K/UL — SIGNIFICANT CHANGE UP (ref 150–400)
POTASSIUM SERPL-MCNC: 4.9 MMOL/L — SIGNIFICANT CHANGE UP (ref 3.5–5.3)
POTASSIUM SERPL-SCNC: 4.9 MMOL/L — SIGNIFICANT CHANGE UP (ref 3.5–5.3)
RBC # BLD: 4.47 M/UL — SIGNIFICANT CHANGE UP (ref 4.2–5.8)
RBC # FLD: 12.9 % — SIGNIFICANT CHANGE UP (ref 10.3–14.5)
SODIUM SERPL-SCNC: 137 MMOL/L — SIGNIFICANT CHANGE UP (ref 135–145)
WBC # BLD: 11.28 K/UL — HIGH (ref 3.8–10.5)
WBC # FLD AUTO: 11.28 K/UL — HIGH (ref 3.8–10.5)

## 2024-08-30 PROCEDURE — 20985 CPTR-ASST DIR MS PX: CPT

## 2024-08-30 PROCEDURE — 27447 TOTAL KNEE ARTHROPLASTY: CPT | Mod: AS

## 2024-08-30 PROCEDURE — 20985 CPTR-ASST DIR MS PX: CPT | Mod: AS

## 2024-08-30 PROCEDURE — 73560 X-RAY EXAM OF KNEE 1 OR 2: CPT | Mod: 26,RT

## 2024-08-30 PROCEDURE — 27447 TOTAL KNEE ARTHROPLASTY: CPT | Mod: RT

## 2024-08-30 PROCEDURE — 27447 TOTAL KNEE ARTHROPLASTY: CPT | Mod: AS,RT

## 2024-08-30 RX ORDER — ASPIRIN 325 MG
1 TABLET ORAL
Qty: 60 | Refills: 0
Start: 2024-08-30 | End: 2024-09-28

## 2024-08-30 RX ORDER — ONDANSETRON HCL/PF 4 MG/2 ML
4 VIAL (ML) INJECTION ONCE
Refills: 0 | Status: DISCONTINUED | OUTPATIENT
Start: 2024-08-30 | End: 2024-08-30

## 2024-08-30 RX ORDER — SENNA 187 MG
2 TABLET ORAL
Qty: 0 | Refills: 0 | DISCHARGE
Start: 2024-08-30

## 2024-08-30 RX ORDER — ASPIRIN 500 MG
1 TABLET ORAL
Refills: 0 | DISCHARGE

## 2024-08-30 RX ORDER — FENTANYL CITRATE-0.9 % NACL/PF 100MCG/2ML
50 SYRINGE (ML) INTRAVENOUS ONCE
Refills: 0 | Status: DISCONTINUED | OUTPATIENT
Start: 2024-08-30 | End: 2024-08-30

## 2024-08-30 RX ORDER — ACETAMINOPHEN 500 MG/5ML
2 LIQUID (ML) ORAL
Qty: 0 | Refills: 0 | DISCHARGE
Start: 2024-08-30

## 2024-08-30 RX ORDER — TICAGRELOR 90 MG/1
1 TABLET ORAL
Refills: 0 | DISCHARGE

## 2024-08-30 RX ORDER — ASPIRIN 81 MG
1 TABLET, DELAYED RELEASE (ENTERIC COATED) ORAL
Qty: 60 | Refills: 0 | DISCHARGE
Start: 2024-08-30 | End: 2024-09-28

## 2024-08-30 RX ORDER — SODIUM CHLORIDE 9 G/1000ML
1000 INJECTION, SOLUTION INTRAVENOUS
Refills: 0 | Status: DISCONTINUED | OUTPATIENT
Start: 2024-08-30 | End: 2024-08-30

## 2024-08-30 RX ORDER — ATORVASTATIN CALCIUM 40 MG/1
1 TABLET, FILM COATED ORAL
Refills: 0 | DISCHARGE

## 2024-08-30 RX ORDER — LEVETIRACETAM 1000 MG/1
1 TABLET, FILM COATED ORAL
Refills: 0 | DISCHARGE

## 2024-08-30 RX ORDER — ACETAMINOPHEN 500 MG/5ML
650 LIQUID (ML) ORAL ONCE
Refills: 0 | Status: COMPLETED | OUTPATIENT
Start: 2024-08-30 | End: 2024-08-30

## 2024-08-30 RX ORDER — ASPIRIN 325 MG
81 TABLET ORAL
Refills: 0 | Status: DISCONTINUED | OUTPATIENT
Start: 2024-08-31 | End: 2024-08-31

## 2024-08-30 RX ORDER — NALOXONE HYDROCHLORIDE 0.4 MG/ML
4 INJECTION, SOLUTION INTRAMUSCULAR; INTRAVENOUS; SUBCUTANEOUS
Qty: 1 | Refills: 0
Start: 2024-08-30 | End: 2024-08-30

## 2024-08-30 RX ORDER — B1/B2/B3/B5/B6/B12/VIT C/FOLIC 500-0.5 MG
1 TABLET ORAL
Qty: 0 | Refills: 0 | DISCHARGE
Start: 2024-08-30

## 2024-08-30 RX ORDER — OLMESARTAN MEDOXOMIL 5 MG/1
10 TABLET, COATED ORAL
Refills: 0 | DISCHARGE

## 2024-08-30 RX ORDER — CEFAZOLIN SODIUM IN 0.9 % NACL 3 G/100 ML
2000 INTRAVENOUS SOLUTION, PIGGYBACK (ML) INTRAVENOUS EVERY 8 HOURS
Refills: 0 | Status: COMPLETED | OUTPATIENT
Start: 2024-08-30 | End: 2024-08-31

## 2024-08-30 RX ORDER — OXYCODONE HYDROCHLORIDE 30 MG/1
1 TABLET ORAL
Qty: 42 | Refills: 0
Start: 2024-08-30 | End: 2024-09-05

## 2024-08-30 RX ORDER — METOPROLOL TARTRATE 100 MG
1 TABLET ORAL
Refills: 0 | DISCHARGE

## 2024-08-30 RX ORDER — CELECOXIB 50 MG/1
1 CAPSULE ORAL
Qty: 60 | Refills: 0
Start: 2024-08-30 | End: 2024-09-28

## 2024-08-30 RX ORDER — GABAPENTIN 400 MG/1
1 CAPSULE ORAL
Qty: 0 | Refills: 0 | DISCHARGE

## 2024-08-30 RX ORDER — CELECOXIB 50 MG/1
200 CAPSULE ORAL ONCE
Refills: 0 | Status: COMPLETED | OUTPATIENT
Start: 2024-08-30 | End: 2024-08-30

## 2024-08-30 RX ADMIN — LEVETIRACETAM 500 MILLIGRAM(S): 10 INJECTION, SOLUTION INTRAVENOUS at 18:52

## 2024-08-30 RX ADMIN — OXYCODONE HYDROCHLORIDE 5 MILLIGRAM(S): 30 TABLET ORAL at 20:28

## 2024-08-30 RX ADMIN — POLYETHYLENE GLYCOL 3350 17 GRAM(S): 17 POWDER, FOR SOLUTION ORAL at 20:34

## 2024-08-30 RX ADMIN — Medication 1000 MILLIGRAM(S): at 16:59

## 2024-08-30 RX ADMIN — Medication 100 MILLIGRAM(S): at 17:07

## 2024-08-30 RX ADMIN — Medication 2 TABLET(S): at 20:34

## 2024-08-30 RX ADMIN — Medication 400 MILLIGRAM(S): at 23:06

## 2024-08-30 RX ADMIN — Medication 1000 MILLIGRAM(S): at 23:45

## 2024-08-30 RX ADMIN — OXYCODONE HYDROCHLORIDE 10 MILLIGRAM(S): 30 TABLET ORAL at 18:01

## 2024-08-30 RX ADMIN — ATORVASTATIN CALCIUM 20 MILLIGRAM(S): 80 TABLET, FILM COATED ORAL at 20:35

## 2024-08-30 RX ADMIN — Medication 500 MILLILITER(S): at 11:46

## 2024-08-30 RX ADMIN — Medication 650 MILLIGRAM(S): at 08:15

## 2024-08-30 RX ADMIN — GABAPENTIN 300 MILLIGRAM(S): 400 CAPSULE ORAL at 16:59

## 2024-08-30 RX ADMIN — OXYCODONE HYDROCHLORIDE 10 MILLIGRAM(S): 30 TABLET ORAL at 17:01

## 2024-08-30 RX ADMIN — GABAPENTIN 300 MILLIGRAM(S): 400 CAPSULE ORAL at 20:35

## 2024-08-30 RX ADMIN — Medication 125 MILLILITER(S): at 17:08

## 2024-08-30 RX ADMIN — OXYCODONE HYDROCHLORIDE 5 MILLIGRAM(S): 30 TABLET ORAL at 21:20

## 2024-08-30 RX ADMIN — CELECOXIB 200 MILLIGRAM(S): 50 CAPSULE ORAL at 08:15

## 2024-08-30 RX ADMIN — Medication 3 MILLIGRAM(S): at 20:35

## 2024-08-30 RX ADMIN — Medication 1000 MILLIGRAM(S): at 17:40

## 2024-08-31 ENCOUNTER — TRANSCRIPTION ENCOUNTER (OUTPATIENT)
Age: 64
End: 2024-08-31

## 2024-08-31 VITALS
RESPIRATION RATE: 18 BRPM | TEMPERATURE: 98 F | HEART RATE: 69 BPM | OXYGEN SATURATION: 98 % | DIASTOLIC BLOOD PRESSURE: 63 MMHG | SYSTOLIC BLOOD PRESSURE: 111 MMHG

## 2024-08-31 LAB
ANION GAP SERPL CALC-SCNC: 6 MMOL/L — SIGNIFICANT CHANGE UP (ref 5–17)
BUN SERPL-MCNC: 17 MG/DL — SIGNIFICANT CHANGE UP (ref 7–23)
CALCIUM SERPL-MCNC: 8.8 MG/DL — SIGNIFICANT CHANGE UP (ref 8.5–10.1)
CHLORIDE SERPL-SCNC: 108 MMOL/L — SIGNIFICANT CHANGE UP (ref 96–108)
CO2 SERPL-SCNC: 23 MMOL/L — SIGNIFICANT CHANGE UP (ref 22–31)
CREAT SERPL-MCNC: 0.95 MG/DL — SIGNIFICANT CHANGE UP (ref 0.5–1.3)
EGFR: 90 ML/MIN/1.73M2 — SIGNIFICANT CHANGE UP
EGFR: 90 ML/MIN/1.73M2 — SIGNIFICANT CHANGE UP
GLUCOSE SERPL-MCNC: 165 MG/DL — HIGH (ref 70–99)
HCT VFR BLD CALC: 33.5 % — LOW (ref 39–50)
HGB BLD-MCNC: 11.3 G/DL — LOW (ref 13–17)
MCHC RBC-ENTMCNC: 30.6 PG — SIGNIFICANT CHANGE UP (ref 27–34)
MCHC RBC-ENTMCNC: 33.7 G/DL — SIGNIFICANT CHANGE UP (ref 32–36)
MCV RBC AUTO: 90.8 FL — SIGNIFICANT CHANGE UP (ref 80–100)
NRBC # BLD: 0 /100 WBCS — SIGNIFICANT CHANGE UP (ref 0–0)
NRBC BLD-RTO: 0 /100 WBCS — SIGNIFICANT CHANGE UP (ref 0–0)
PLATELET # BLD AUTO: 171 K/UL — SIGNIFICANT CHANGE UP (ref 150–400)
POTASSIUM SERPL-MCNC: 4.7 MMOL/L — SIGNIFICANT CHANGE UP (ref 3.5–5.3)
POTASSIUM SERPL-SCNC: 4.7 MMOL/L — SIGNIFICANT CHANGE UP (ref 3.5–5.3)
RBC # BLD: 3.69 M/UL — LOW (ref 4.2–5.8)
RBC # FLD: 13 % — SIGNIFICANT CHANGE UP (ref 10.3–14.5)
SODIUM SERPL-SCNC: 137 MMOL/L — SIGNIFICANT CHANGE UP (ref 135–145)
WBC # BLD: 15.25 K/UL — HIGH (ref 3.8–10.5)
WBC # FLD AUTO: 15.25 K/UL — HIGH (ref 3.8–10.5)

## 2024-08-31 RX ADMIN — CELECOXIB 200 MILLIGRAM(S): 50 CAPSULE ORAL at 07:30

## 2024-08-31 RX ADMIN — OXYCODONE HYDROCHLORIDE 10 MILLIGRAM(S): 30 TABLET ORAL at 09:42

## 2024-08-31 RX ADMIN — TICAGRELOR 90 MILLIGRAM(S): 90 TABLET ORAL at 06:33

## 2024-08-31 RX ADMIN — LEVETIRACETAM 500 MILLIGRAM(S): 10 INJECTION, SOLUTION INTRAVENOUS at 06:32

## 2024-08-31 RX ADMIN — Medication 1000 MILLIGRAM(S): at 07:30

## 2024-08-31 RX ADMIN — Medication 81 MILLIGRAM(S): at 06:31

## 2024-08-31 RX ADMIN — Medication 1000 MILLIGRAM(S): at 06:32

## 2024-08-31 RX ADMIN — CELECOXIB 200 MILLIGRAM(S): 50 CAPSULE ORAL at 06:32

## 2024-08-31 RX ADMIN — METOPROLOL SUCCINATE 25 MILLIGRAM(S): 50 TABLET, EXTENDED RELEASE ORAL at 06:33

## 2024-08-31 RX ADMIN — Medication 40 MILLIGRAM(S): at 06:32

## 2024-08-31 RX ADMIN — Medication 100 MILLIGRAM(S): at 00:52

## 2024-08-31 RX ADMIN — DEXAMETHASONE 102 MILLIGRAM(S): 0.5 TABLET ORAL at 06:43

## 2024-08-31 RX ADMIN — Medication 1 TABLET(S): at 11:42

## 2024-08-31 RX ADMIN — OXYCODONE HYDROCHLORIDE 10 MILLIGRAM(S): 30 TABLET ORAL at 10:42

## 2024-08-31 RX ADMIN — GABAPENTIN 300 MILLIGRAM(S): 400 CAPSULE ORAL at 06:32

## 2024-08-31 RX ADMIN — LOSARTAN POTASSIUM 25 MILLIGRAM(S): 100 TABLET, FILM COATED ORAL at 06:34

## 2024-09-05 DIAGNOSIS — I10 ESSENTIAL (PRIMARY) HYPERTENSION: ICD-10-CM

## 2024-09-05 DIAGNOSIS — G40.909 EPILEPSY, UNSPECIFIED, NOT INTRACTABLE, WITHOUT STATUS EPILEPTICUS: ICD-10-CM

## 2024-09-05 DIAGNOSIS — E78.5 HYPERLIPIDEMIA, UNSPECIFIED: ICD-10-CM

## 2024-09-05 DIAGNOSIS — I25.10 ATHEROSCLEROTIC HEART DISEASE OF NATIVE CORONARY ARTERY WITHOUT ANGINA PECTORIS: ICD-10-CM

## 2024-09-05 DIAGNOSIS — I34.1 NONRHEUMATIC MITRAL (VALVE) PROLAPSE: ICD-10-CM

## 2024-09-05 DIAGNOSIS — M17.11 UNILATERAL PRIMARY OSTEOARTHRITIS, RIGHT KNEE: ICD-10-CM

## 2024-09-05 DIAGNOSIS — M65.9 SYNOVITIS AND TENOSYNOVITIS, UNSPECIFIED: ICD-10-CM

## 2024-09-05 DIAGNOSIS — Z79.82 LONG TERM (CURRENT) USE OF ASPIRIN: ICD-10-CM

## 2024-09-10 ENCOUNTER — APPOINTMENT (OUTPATIENT)
Dept: ORTHOPEDIC SURGERY | Facility: CLINIC | Age: 64
End: 2024-09-10
Payer: OTHER MISCELLANEOUS

## 2024-09-10 DIAGNOSIS — Z96.651 PRESENCE OF RIGHT ARTIFICIAL KNEE JOINT: ICD-10-CM

## 2024-09-10 PROCEDURE — 73562 X-RAY EXAM OF KNEE 3: CPT | Mod: RT

## 2024-09-10 PROCEDURE — 99024 POSTOP FOLLOW-UP VISIT: CPT

## 2024-09-14 NOTE — WORK
[Other: ___] : [unfilled] [Was the competent medical cause of the injury] : was the competent medical cause of the injury [Are consistent with the injury] : are consistent with the injury [Consistent with my objective findings] : consistent with my objective findings [Total (100%)] : total (100%) [Does not reveal pre-existing condition(s) that may affect treatment/prognosis] : does not reveal pre-existing condition(s) that may affect treatment/prognosis

## 2024-09-18 NOTE — HISTORY OF PRESENT ILLNESS
[de-identified] : Patient is here for 1st post op visit from s on 8/30/24 - right knee TKA. Has been doing at home PT with improvement. Bent at 90 degrees. WB with crutches for assist. No issues or concerns since surgery. Denies fevers/chills. Currently taking blood thinner/aspirin nightly. Retired. WC DOI: 11/17/21. : Dimitrios.

## 2024-09-18 NOTE — HISTORY OF PRESENT ILLNESS
[de-identified] : Patient is here for 1st post op visit from s on 8/30/24 - right knee TKA. Has been doing at home PT with improvement. Bent at 90 degrees. WB with crutches for assist. No issues or concerns since surgery. Denies fevers/chills. Currently taking blood thinner/aspirin nightly. Retired. WC DOI: 11/17/21. : Dimitrios.

## 2024-09-18 NOTE — PHYSICAL EXAM
[Right] : right knee [NL (0)] : extension 0 degrees [] : no drainage [de-identified] : deferred due to PO status [FreeTextEntry9] :  Reviewed X-Ray right  knee revealing evidence of previous total knee arthroplasty with all hardware in proper anatomical positioning, no evidence of loosening. [TWNoteComboBox7] : flexion 70 degrees

## 2024-09-18 NOTE — DISCUSSION/SUMMARY
[de-identified] :  WC DOI: 11/17/21.-retired  The patient is approximately 11 days s/p  right knee TKA(DOS: 8/30/24 ) - normal course with good progress and no evidence of infection. Incision sites are well approximated, clean, dry, intact, without drainage, without erythema.     Reviewed X-Ray right knee revealing evidence of previous total knee arthroplasty with all hardware in proper anatomical positioning, no evidence of loosening.   The patient is instructed in wound management. The patient's post-op plan, protocol and activity modifications have been thoroughly discussed and the patient expressed understanding.  Pt can slowly wean from crutches when he is comfortable Continue PT   Follow up in 2 weeks   I, Marely Jean, attest that this documentation has been prepared under the direction and in the presence of Provider Dr. Ebenezer Gilbert

## 2024-09-18 NOTE — PHYSICAL EXAM
[Right] : right knee [NL (0)] : extension 0 degrees [] : no drainage [de-identified] : deferred due to PO status [FreeTextEntry9] :  Reviewed X-Ray right  knee revealing evidence of previous total knee arthroplasty with all hardware in proper anatomical positioning, no evidence of loosening. [TWNoteComboBox7] : flexion 70 degrees

## 2024-09-18 NOTE — DISCUSSION/SUMMARY
[de-identified] :  WC DOI: 11/17/21.-retired  The patient is approximately 11 days s/p  right knee TKA(DOS: 8/30/24 ) - normal course with good progress and no evidence of infection. Incision sites are well approximated, clean, dry, intact, without drainage, without erythema.     Reviewed X-Ray right knee revealing evidence of previous total knee arthroplasty with all hardware in proper anatomical positioning, no evidence of loosening.   The patient is instructed in wound management. The patient's post-op plan, protocol and activity modifications have been thoroughly discussed and the patient expressed understanding.  Pt can slowly wean from crutches when he is comfortable Continue PT   Follow up in 2 weeks   I, Marely Jean, attest that this documentation has been prepared under the direction and in the presence of Provider Dr. Ebenezer Gilbert

## 2024-09-18 NOTE — DISCUSSION/SUMMARY
[de-identified] :  WC DOI: 11/17/21.-retired  The patient is approximately 11 days s/p  right knee TKA(DOS: 8/30/24 ) - normal course with good progress and no evidence of infection. Incision sites are well approximated, clean, dry, intact, without drainage, without erythema.     Reviewed X-Ray right knee revealing evidence of previous total knee arthroplasty with all hardware in proper anatomical positioning, no evidence of loosening.   The patient is instructed in wound management. The patient's post-op plan, protocol and activity modifications have been thoroughly discussed and the patient expressed understanding.  Pt can slowly wean from crutches when he is comfortable Continue PT   Follow up in 2 weeks   I, Marely Jean, attest that this documentation has been prepared under the direction and in the presence of Provider Dr. Ebenezer Gilbert

## 2024-09-18 NOTE — HISTORY OF PRESENT ILLNESS
[de-identified] : Patient is here for 1st post op visit from s on 8/30/24 - right knee TKA. Has been doing at home PT with improvement. Bent at 90 degrees. WB with crutches for assist. No issues or concerns since surgery. Denies fevers/chills. Currently taking blood thinner/aspirin nightly. Retired. WC DOI: 11/17/21. : Dimitrios.

## 2024-09-18 NOTE — PHYSICAL EXAM
[Right] : right knee [NL (0)] : extension 0 degrees [] : no drainage [de-identified] : deferred due to PO status [FreeTextEntry9] :  Reviewed X-Ray right  knee revealing evidence of previous total knee arthroplasty with all hardware in proper anatomical positioning, no evidence of loosening. [TWNoteComboBox7] : flexion 70 degrees

## 2024-09-19 NOTE — WORK
"Goal Outcome Evaluation:  Plan of Care Reviewed With: patient  Patient Agreement with Plan of Care: agrees     Progress: improving  Outcome Evaluation: Pt continues to c/o right ankle pain. Trace edema noted to right ankle with slight bruising. Pt reports he caught ankle in bed frame at rehab a few days ago. Pt continues to verbalize wanting an xray on right ankle. Pt reports good appetite and \"so,so\" sleep. Rates anxiety 5 and depression 3. Denies SI,HI,AVH. Pt reports meds are helping \" a little\" but verbalizes needing more help with anxiety.                               " [Total (100%)] : total (100%) [FreeTextEntry1] : fair

## 2024-09-24 ENCOUNTER — APPOINTMENT (OUTPATIENT)
Dept: ORTHOPEDIC SURGERY | Facility: CLINIC | Age: 64
End: 2024-09-24

## 2024-09-24 DIAGNOSIS — M17.12 UNILATERAL PRIMARY OSTEOARTHRITIS, LEFT KNEE: ICD-10-CM

## 2024-09-24 DIAGNOSIS — Z96.651 PRESENCE OF RIGHT ARTIFICIAL KNEE JOINT: ICD-10-CM

## 2024-09-24 PROCEDURE — 99215 OFFICE O/P EST HI 40 MIN: CPT | Mod: 24

## 2024-09-24 RX ORDER — MELOXICAM 15 MG/1
15 TABLET ORAL DAILY
Qty: 30 | Refills: 1 | Status: ACTIVE | COMMUNITY
Start: 2024-09-24 | End: 1900-01-01

## 2024-10-05 NOTE — DISCUSSION/SUMMARY
[de-identified] :  DOI: 11/17/21 In regard to RIGHT KNEE... The patient is approximately 11 days s/p right TKA (DOS: 8/30/24) - normal course with good progress and no evidence of infection. Incision sites are well approximated, clean, dry, intact, without drainage, without erythema. The patient's post-op plan, protocol and activity modifications have been thoroughly discussed and the patient expressed understanding. He may gradually discontinue use of cane  Continue physical therapy, emphasized pushing knee flexion.   In regard to LEFT KNEE... Re-reviewed previous X-Ray left knee reveals evidence of advanced tricompartmental arthritis, worse medially and patellofemoral with associated bone spurs.   Discussed risks of surgical treatment and nonsurgical treatment of arthritis, discussed risks of steroid injection plus or minus Visco supplementation, risks of Zilretta and benefits, role of surgery and MRI, risks and role of NSAIDs and side effects, benefits of therapy. We had an extensive discussion regarding total knee replacement surgery intervention including risk, benefits and alternatives.  The risks include, but are not limited to infection, bleeding, injury to small nerves and blood vessels, pain, stiffness, phlebitis, DVT and need for secondary procedures.  there are people despite well done surgery who can lose their leg or life as a result of unforeseen complications. Preoperative, intraoperative and postoperative care were discussed and outlined to the patient as well.  I have also talked to the patient about the specific risks of computer assisted surgery and robotic surgery including the reported risk of malalignment, increased injury to neurovascular structures; ligamentous structure with minimally invasive computer assisted surgery as well as iatrogenic injury from the pin sites, risks of fracture around the pins or computer error.  In my opinion the benefits outweigh the risk.  The patient would like to proceed with this.    Hold off on obtaining pre-op CT scan until auth from    The patient requires cardiac clearance due to stents, history of heart attack.   Prescribed patient Meloxicam 15mg daily and discussed risks of side effects, as well as timing/management of medication.  Side effects can include but are not limited to gastrointestinal ulcers and irritation, kidney failure, and bleeding issues. Use as directed and take with food to manage pain, inflammation, and discomfort.   Follow up in 6 weeks for right knee follow up.

## 2024-10-05 NOTE — PHYSICAL EXAM
[Right] : right knee [NL (0)] : extension 0 degrees [Left] : left knee [4___] : quadriceps 4[unfilled]/5 [Equivocal] : equivocal Mauricio [] : no calf tenderness [FreeTextEntry9] : Stable throughout range of motion. [de-identified] : 1cm symmetrical in all planes [TWNoteComboBox7] : flexion 120 degrees [de-identified] : extension 3 degrees

## 2024-10-05 NOTE — HISTORY OF PRESENT ILLNESS
[de-identified] : Patient is here for a post operative appointment for the right knee, WC DOI 11/17/21. 8/30/24 right TKA. Patient states pain has improved since the previous visit. Patient notes soreness in the knee that is most prevalent when driving. Patient is currently attending physical therapy at [facility] in [place].

## 2024-10-05 NOTE — DISCUSSION/SUMMARY
[de-identified] :  DOI: 11/17/21 In regard to RIGHT KNEE... The patient is approximately 11 days s/p right TKA (DOS: 8/30/24) - normal course with good progress and no evidence of infection. Incision sites are well approximated, clean, dry, intact, without drainage, without erythema. The patient's post-op plan, protocol and activity modifications have been thoroughly discussed and the patient expressed understanding. He may gradually discontinue use of cane  Continue physical therapy, emphasized pushing knee flexion.   In regard to LEFT KNEE... Re-reviewed previous X-Ray left knee reveals evidence of advanced tricompartmental arthritis, worse medially and patellofemoral with associated bone spurs.   Discussed risks of surgical treatment and nonsurgical treatment of arthritis, discussed risks of steroid injection plus or minus Visco supplementation, risks of Zilretta and benefits, role of surgery and MRI, risks and role of NSAIDs and side effects, benefits of therapy. We had an extensive discussion regarding total knee replacement surgery intervention including risk, benefits and alternatives.  The risks include, but are not limited to infection, bleeding, injury to small nerves and blood vessels, pain, stiffness, phlebitis, DVT and need for secondary procedures.  there are people despite well done surgery who can lose their leg or life as a result of unforeseen complications. Preoperative, intraoperative and postoperative care were discussed and outlined to the patient as well.  I have also talked to the patient about the specific risks of computer assisted surgery and robotic surgery including the reported risk of malalignment, increased injury to neurovascular structures; ligamentous structure with minimally invasive computer assisted surgery as well as iatrogenic injury from the pin sites, risks of fracture around the pins or computer error.  In my opinion the benefits outweigh the risk.  The patient would like to proceed with this.    Hold off on obtaining pre-op CT scan until auth from    The patient requires cardiac clearance due to stents, history of heart attack.   Prescribed patient Meloxicam 15mg daily and discussed risks of side effects, as well as timing/management of medication.  Side effects can include but are not limited to gastrointestinal ulcers and irritation, kidney failure, and bleeding issues. Use as directed and take with food to manage pain, inflammation, and discomfort.   Follow up in 6 weeks for right knee follow up.

## 2024-10-05 NOTE — PHYSICAL EXAM
[Right] : right knee [NL (0)] : extension 0 degrees [Left] : left knee [4___] : quadriceps 4[unfilled]/5 [Equivocal] : equivocal Mauricio [] : no calf tenderness [FreeTextEntry9] : Stable throughout range of motion. [de-identified] : 1cm symmetrical in all planes [TWNoteComboBox7] : flexion 120 degrees [de-identified] : extension 3 degrees

## 2024-10-05 NOTE — HISTORY OF PRESENT ILLNESS
[de-identified] : Patient is here for a post operative appointment for the right knee, WC DOI 11/17/21. 8/30/24 right TKA. Patient states pain has improved since the previous visit. Patient notes soreness in the knee that is most prevalent when driving. Patient is currently attending physical therapy at [facility] in [place].

## 2024-10-22 ENCOUNTER — APPOINTMENT (OUTPATIENT)
Dept: ORTHOPEDIC SURGERY | Facility: CLINIC | Age: 64
End: 2024-10-22

## 2024-10-22 VITALS — HEIGHT: 67 IN | WEIGHT: 230 LBS | BODY MASS INDEX: 36.1 KG/M2

## 2024-10-22 DIAGNOSIS — M17.12 UNILATERAL PRIMARY OSTEOARTHRITIS, LEFT KNEE: ICD-10-CM

## 2024-10-22 DIAGNOSIS — Z96.651 PRESENCE OF RIGHT ARTIFICIAL KNEE JOINT: ICD-10-CM

## 2024-10-22 PROCEDURE — 99215 OFFICE O/P EST HI 40 MIN: CPT

## 2024-11-19 ENCOUNTER — APPOINTMENT (OUTPATIENT)
Dept: ORTHOPEDIC SURGERY | Facility: CLINIC | Age: 64
End: 2024-11-19

## 2024-11-19 VITALS — WEIGHT: 230 LBS | HEIGHT: 67 IN | BODY MASS INDEX: 36.1 KG/M2

## 2024-11-19 DIAGNOSIS — Z96.651 PRESENCE OF RIGHT ARTIFICIAL KNEE JOINT: ICD-10-CM

## 2024-11-19 PROCEDURE — 73562 X-RAY EXAM OF KNEE 3: CPT | Mod: RT

## 2024-11-19 PROCEDURE — 99024 POSTOP FOLLOW-UP VISIT: CPT

## 2024-12-16 ENCOUNTER — OUTPATIENT (OUTPATIENT)
Dept: OUTPATIENT SERVICES | Facility: HOSPITAL | Age: 64
LOS: 1 days | Discharge: HOME | End: 2024-12-16
Payer: OTHER MISCELLANEOUS

## 2024-12-16 VITALS
SYSTOLIC BLOOD PRESSURE: 111 MMHG | DIASTOLIC BLOOD PRESSURE: 72 MMHG | RESPIRATION RATE: 18 BRPM | OXYGEN SATURATION: 95 % | TEMPERATURE: 99 F | WEIGHT: 219.8 LBS | HEART RATE: 64 BPM | HEIGHT: 67 IN

## 2024-12-16 DIAGNOSIS — Z98.61 CORONARY ANGIOPLASTY STATUS: Chronic | ICD-10-CM

## 2024-12-16 DIAGNOSIS — Z01.812 ENCOUNTER FOR PREPROCEDURAL LABORATORY EXAMINATION: ICD-10-CM

## 2024-12-16 DIAGNOSIS — G40.909 EPILEPSY, UNSPECIFIED, NOT INTRACTABLE, WITHOUT STATUS EPILEPTICUS: ICD-10-CM

## 2024-12-16 DIAGNOSIS — M17.12 UNILATERAL PRIMARY OSTEOARTHRITIS, LEFT KNEE: ICD-10-CM

## 2024-12-16 DIAGNOSIS — Z96.651 PRESENCE OF RIGHT ARTIFICIAL KNEE JOINT: Chronic | ICD-10-CM

## 2024-12-16 DIAGNOSIS — I10 ESSENTIAL (PRIMARY) HYPERTENSION: ICD-10-CM

## 2024-12-16 DIAGNOSIS — Z98.890 OTHER SPECIFIED POSTPROCEDURAL STATES: Chronic | ICD-10-CM

## 2024-12-16 DIAGNOSIS — Z01.818 ENCOUNTER FOR OTHER PREPROCEDURAL EXAMINATION: ICD-10-CM

## 2024-12-16 DIAGNOSIS — I25.10 ATHEROSCLEROTIC HEART DISEASE OF NATIVE CORONARY ARTERY WITHOUT ANGINA PECTORIS: ICD-10-CM

## 2024-12-16 LAB
A1C WITH ESTIMATED AVERAGE GLUCOSE RESULT: 5.4 % — SIGNIFICANT CHANGE UP (ref 4–5.6)
ALBUMIN SERPL ELPH-MCNC: 3.8 G/DL — SIGNIFICANT CHANGE UP (ref 3.3–5)
ALP SERPL-CCNC: 92 U/L — SIGNIFICANT CHANGE UP (ref 40–120)
ALT FLD-CCNC: 45 U/L — SIGNIFICANT CHANGE UP (ref 12–78)
ANION GAP SERPL CALC-SCNC: 6 MMOL/L — SIGNIFICANT CHANGE UP (ref 5–17)
APTT BLD: 31.8 SEC — SIGNIFICANT CHANGE UP (ref 24.5–35.6)
AST SERPL-CCNC: 14 U/L — LOW (ref 15–37)
BASOPHILS # BLD AUTO: 0.11 K/UL — SIGNIFICANT CHANGE UP (ref 0–0.2)
BASOPHILS NFR BLD AUTO: 1.1 % — SIGNIFICANT CHANGE UP (ref 0–2)
BILIRUB SERPL-MCNC: 0.4 MG/DL — SIGNIFICANT CHANGE UP (ref 0.2–1.2)
BLD GP AB SCN SERPL QL: SIGNIFICANT CHANGE UP
BUN SERPL-MCNC: 12 MG/DL — SIGNIFICANT CHANGE UP (ref 7–23)
CALCIUM SERPL-MCNC: 9.2 MG/DL — SIGNIFICANT CHANGE UP (ref 8.5–10.1)
CHLORIDE SERPL-SCNC: 107 MMOL/L — SIGNIFICANT CHANGE UP (ref 96–108)
CO2 SERPL-SCNC: 26 MMOL/L — SIGNIFICANT CHANGE UP (ref 22–31)
CREAT SERPL-MCNC: 0.97 MG/DL — SIGNIFICANT CHANGE UP (ref 0.5–1.3)
EGFR: 88 ML/MIN/1.73M2 — SIGNIFICANT CHANGE UP
EOSINOPHIL # BLD AUTO: 0.24 K/UL — SIGNIFICANT CHANGE UP (ref 0–0.5)
EOSINOPHIL NFR BLD AUTO: 2.4 % — SIGNIFICANT CHANGE UP (ref 0–6)
ESTIMATED AVERAGE GLUCOSE: 108 MG/DL — SIGNIFICANT CHANGE UP (ref 68–114)
GLUCOSE SERPL-MCNC: 124 MG/DL — HIGH (ref 70–99)
HCT VFR BLD CALC: 46.2 % — SIGNIFICANT CHANGE UP (ref 39–50)
HGB BLD-MCNC: 15.7 G/DL — SIGNIFICANT CHANGE UP (ref 13–17)
IMM GRANULOCYTES NFR BLD AUTO: 0.7 % — SIGNIFICANT CHANGE UP (ref 0–0.9)
INR BLD: 1 RATIO — SIGNIFICANT CHANGE UP (ref 0.85–1.16)
LYMPHOCYTES # BLD AUTO: 1.18 K/UL — SIGNIFICANT CHANGE UP (ref 1–3.3)
LYMPHOCYTES # BLD AUTO: 11.6 % — LOW (ref 13–44)
MCHC RBC-ENTMCNC: 30.2 PG — SIGNIFICANT CHANGE UP (ref 27–34)
MCHC RBC-ENTMCNC: 34 G/DL — SIGNIFICANT CHANGE UP (ref 32–36)
MCV RBC AUTO: 88.8 FL — SIGNIFICANT CHANGE UP (ref 80–100)
MONOCYTES # BLD AUTO: 0.86 K/UL — SIGNIFICANT CHANGE UP (ref 0–0.9)
MONOCYTES NFR BLD AUTO: 8.5 % — SIGNIFICANT CHANGE UP (ref 2–14)
MRSA PCR RESULT.: SIGNIFICANT CHANGE UP
NEUTROPHILS # BLD AUTO: 7.7 K/UL — HIGH (ref 1.8–7.4)
NEUTROPHILS NFR BLD AUTO: 75.7 % — SIGNIFICANT CHANGE UP (ref 43–77)
NRBC # BLD: 0 /100 WBCS — SIGNIFICANT CHANGE UP (ref 0–0)
PLATELET # BLD AUTO: 242 K/UL — SIGNIFICANT CHANGE UP (ref 150–400)
POTASSIUM SERPL-MCNC: 4.3 MMOL/L — SIGNIFICANT CHANGE UP (ref 3.5–5.3)
POTASSIUM SERPL-SCNC: 4.3 MMOL/L — SIGNIFICANT CHANGE UP (ref 3.5–5.3)
PROT SERPL-MCNC: 7.4 GM/DL — SIGNIFICANT CHANGE UP (ref 6–8.3)
PROTHROM AB SERPL-ACNC: 11.6 SEC — SIGNIFICANT CHANGE UP (ref 9.9–13.4)
RBC # BLD: 5.2 M/UL — SIGNIFICANT CHANGE UP (ref 4.2–5.8)
RBC # FLD: 13.2 % — SIGNIFICANT CHANGE UP (ref 10.3–14.5)
S AUREUS DNA NOSE QL NAA+PROBE: SIGNIFICANT CHANGE UP
SODIUM SERPL-SCNC: 139 MMOL/L — SIGNIFICANT CHANGE UP (ref 135–145)
WBC # BLD: 10.16 K/UL — SIGNIFICANT CHANGE UP (ref 3.8–10.5)
WBC # FLD AUTO: 10.16 K/UL — SIGNIFICANT CHANGE UP (ref 3.8–10.5)

## 2024-12-16 PROCEDURE — 93010 ELECTROCARDIOGRAM REPORT: CPT

## 2024-12-16 RX ORDER — SODIUM CHLORIDE 9 MG/ML
3 INJECTION, SOLUTION INTRAMUSCULAR; INTRAVENOUS; SUBCUTANEOUS EVERY 8 HOURS
Refills: 0 | Status: DISCONTINUED | OUTPATIENT
Start: 2025-01-16 | End: 2025-01-16

## 2024-12-16 NOTE — OCCUPATIONAL THERAPY INITIAL EVALUATION ADULT - PRECAUTIONS/LIMITATIONS, REHAB EVAL
Pt's mobility and ADL management is limited due to pain in left knee ./fall precautions Pt's mobility and ADL management is limited due to pain in left knee ./cardiac precautions/fall precautions

## 2024-12-16 NOTE — OCCUPATIONAL THERAPY INITIAL EVALUATION ADULT - PERTINENT HX OF CURRENT PROBLEM, REHAB EVAL
Pt is a 65 y/o male slated for elective surgery for left TKR with MD Gilbert on 1/16/2025 due to OA, chronic pain , work related injury and DJD. Pt had right TKR on August 30, 2024 and was discharged home with rehab service. Pt denied buckling or any falls in the past 3-6 months . Pt is a 63 y/o male slated for elective surgery for left TKR with MD Gilbert on 1/16/2025 due to OA, chronic pain , work related injury and DJD. Pt had right TKR on August 30, 2024 and was discharged home with rehab service. Pt denied buckling or any falls in the past 3-6 months . Pt has h/o herniated disc and s/p heart attack in June 2023.

## 2024-12-16 NOTE — OCCUPATIONAL THERAPY INITIAL EVALUATION ADULT - NSOTDISCHREC_GEN_A_CORE
postoperatively , pending functional assessment , to prevent falls, optimize pt's ability for ADL management & safely navigate in all terrains . Pt has all the required DME/Home OT

## 2024-12-16 NOTE — OCCUPATIONAL THERAPY INITIAL EVALUATION ADULT - RANGE OF MOTION EXAMINATION, UPPER EXTREMITY
Pt is unable to make a complete composit fist with right hand/bilateral UE Active ROM was WFL  (within functional limits)/bilateral UE Passive ROM was WFL  (within functional limits)

## 2024-12-16 NOTE — H&P PST ADULT - CARDIOVASCULAR
regular rate and rhythm/S1 S2 present details… Topical Clindamycin Counseling: Patient counseled that this medication may cause skin irritation or allergic reactions.  In the event of skin irritation, the patient was advised to reduce the amount of the drug applied or use it less frequently.   The patient verbalized understanding of the proper use and possible adverse effects of clindamycin.  All of the patient's questions and concerns were addressed.

## 2024-12-16 NOTE — OCCUPATIONAL THERAPY INITIAL EVALUATION ADULT - PAIN SENSATION, RUE, REHAB EVAL
[FreeTextEntry1] : Rangel MRI of the cervical and lumbar spine were reviewed. There is mild degenerative change without any evidence of stenosis or significant cord compression or nerve compression in the cervical or lumbar spine
within normal limits

## 2024-12-16 NOTE — OCCUPATIONAL THERAPY INITIAL EVALUATION ADULT - DEEP PRESSURE SENSATION, HEAD/NECK, OT EVAL
Discharge Instructions    Low Risk CHEST PAIN:  Based on your visit today, the health care provider doesn’t know what is causing your chest pain. In most cases, people who come to the emergency department with chest pain don’t have a problem with their heart. Instead, the pain is caused by other conditions, including lungs, muscles, bones, digestive tract, nerves or mental health.     There are risk factors that can lead to heart disease in the future. There are some risk factors that cannot be changed like age, gender, family history and race. There are other risk factors that you can take measures to change them:    Manage Unhealthy cholesterol levels  Cholesterol is a fat-like substance in your blood. Know your numbers:  Good ranges: LDL (bad) <100, HDL (good) >50, Triglycerides <150  If you smoke, stop smoking  This is the most important risk factor you can change. Smoking causes inflammation and damages the smooth muscle that lines the arteries making them less flexible.   Smoking Cessation Counseling offered [place] Toll Fee Quit Line: [phone]  High blood pressure  High blood pressure raises your risk of heart attack and stroke. The brain tissue is especially sensitive to high blood pressure damage. You’re at risk if your blood pressure is 120/80 or higher.  Diabetes  Diabetes occurs when you have high levels of sugar (glucose) in your blood. This can damage arteries if not kept under control.   Excess weight  Excess weight makes other risk factors, such as diabetes, more likely. Excess weight around the waist or stomach increases your heart disease risk the most.  Lack of physical activity  When you’re not active, you’re more likely to develop diabetes, high blood pressure, high cholesterol levels, and excess weight. Exercise can improve your cholesterol, lower your blood pressure, control diabetes and reduce your risk of getting heart disease.    Good Nutrition Basics    Balance. Eat a mix of  different types of food. Make half your plate fruits and vegetables. Then fill your plate with one-quarter protein and one-quarter whole grains.  Variety. Choose a wide range of foods of different colors.  Flexibility. Find an eating plan that fits your schedule and tastes.  Moderation. Avoid too much of any one food.  Read food labels. (For more information on reading food labels visit www.heart.org or call 1-825.393.4007)    Medications:  See Medication List (keep list up to date and bring it to your doctor appointments)   Call 911 if any of these occur:   Chest discomfort in the center of the chest that lasts more than a few minutes, or that goes away and comes back. It can feel like uncomfortable pressure, squeezing, burning, fullness, tightness, or pain.   Discomfort in other areas of the upper body. Symptoms can include pain or discomfort in one or both arms, the back, neck, jaw or stomach.  Shortness of breath with or without chest discomfort.  Other signs may include breaking out in a cold sweat, nausea, or lightheadedness.  Symptoms of a heart attack can be different in women, diabetics or the elderly    within normal limits

## 2024-12-16 NOTE — OCCUPATIONAL THERAPY INITIAL EVALUATION ADULT - ADDITIONAL COMMENTS
At this time, pt is functioning in his roles, self sufficient, driving & ambulating independently in the community without any assistive devices.  Pt has an antalgic gait. Pt owns a rolling walker, 3:1 commode and SAC from prior right TKR. All are in good condition and easily accessible Pt denied any adverse reactions to medication. Pt is right hand dominant and wears glasses all the time. Dexterity and fine motor skills are diminished in both hands due to arthritic changes., At this time, pt is functioning in his roles, self sufficient, driving & ambulating independently in the community without any assistive devices.  Pt has an antalgic gait. Pt owns a rolling walker, 3:1 commode and SAC from prior right TKR. All are in good condition and easily accessible. Pt c/o 6/10 pain in his left knee at rest and 9/10 at worse. The pain is exacerbated, by walking, prolonged standing, negotiating steps and is relieved with gabapentin. Pt denied any adverse reactions to medication. Pt is right hand dominant and wears glasses all the time. Dexterity and fine motor skills are diminished in both hands due to arthritic changes.,

## 2024-12-16 NOTE — OCCUPATIONAL THERAPY INITIAL EVALUATION ADULT - LIVES WITH, PROFILE
Pt lives in a private house with 3 entry steps with no hand rails that cannot be reached simultaneously. Once inside, pt has to negotiate a flight of stairs with 15 steps, left ascending handrail to access the bedroom and bathroom. The home is equipped with 2 bathrooms one located on each level. The bathroom upstairs has walk in shower combination, fixed shower and standard toilet. The bathroom at the main level has a tub/shower combination, grab bars, fixed / retractable shower head and comfort height toilet with adequate space to fit a commode over it

## 2024-12-16 NOTE — OCCUPATIONAL THERAPY INITIAL EVALUATION ADULT - SOCIAL CONCERNS
Pt voiced concerns about his recovery at home. Pt endorsed that his spouse will be able to assist him after he is discharge home post-operatively./Complex psychosocial needs/coping issues Pt voiced concerns about his recovery at home. Pt endorsed that his spouse will be able to assist him after he is discharged home post-operatively./Complex psychosocial needs/coping issues

## 2024-12-16 NOTE — H&P PST ADULT - PROBLEM SELECTOR PLAN 1
Robotic assisted left total knee arthroplasty with tami  labs - cbc,pt/ptt,bmp,t&s,nose cx,ekg  M/C required  cardiac clearance  neurology clearance  preop 3 day hibiclens instruction reviewed and given .instructed on if  nose cx positive use mupuricin 5 days and checklist given  take routine meds DOS with sips of water. avoid NSAID and OTC supplements. verbalized understanding  information on proper nutrition , increase protein and better food choices provided in packet   Ensure clear given  Anesthesiologist to review PST labs, EKG, required clearances and optimization for surgery.

## 2024-12-16 NOTE — H&P PST ADULT - HISTORY OF PRESENT ILLNESS
64M pmh htn, seizure d/o (last seizure 25 years ago on Keppra), CAD (s/p ptca w/stent x1 6-2023 on ASA), c/o left knee pain 2/2 unilateral primary osteoarthritis here for PST for scheduled robotic assisted left total knee arthroplasty with dr. Gilbert on 1-

## 2024-12-16 NOTE — OCCUPATIONAL THERAPY INITIAL EVALUATION ADULT - LEVEL OF INDEPENDENCE: STAND/SIT, REHAB EVAL
What Type Of Note Output Would You Prefer (Optional)?: Standard Output How Severe Are Your Spot(S)?: mild Have Your Spot(S) Been Treated In The Past?: has not been treated Hpi Title: Evaluation of Skin Lesions independent

## 2024-12-16 NOTE — H&P PST ADULT - ASSESSMENT
64M pmh htn, seizure d/o (last seizure 25 years ago on Keppra), CAD (s/p ptca w/stent x1  on ASA), c/o left knee pain 2/2 unilateral primary osteoarthritis here for PST for scheduled robotic assisted left total knee arthroplasty with dr. Gilbert on 2025   CAPRINI SCORE    AGE RELATED RISK FACTORS                                                             [ ] Age 41-60 years                                            (1 Point)  [x ] Age: 61-74 years                                           (2 Points)                 [ ] Age= 75 years                                                (3 Points)             DISEASE RELATED RISK FACTORS                                                       [ ] Edema in the lower extremities                 (1 Point)                     [ ] Varicose veins                                               (1 Point)                                 [x ] BMI > 25 Kg/m2                                            (1 Point)                                  [ ] Serious infection (ie PNA)                            (1 Point)                     [ ] Lung disease ( COPD, Emphysema)            (1 Point)                                                                          [ ] Acute myocardial infarction                         (1 Point)                  [ ] Congestive heart failure (in the previous month)  (1 Point)         [ ] Inflammatory bowel disease                            (1 Point)                  [ ] Central venous access, PICC or Port               (2 points)       (within the last month)                                                                [ ] Stroke (in the previous month)                        (5 Points)    [ ] Previous or present malignancy                       (2 points)                                                                                                                                                         HEMATOLOGY RELATED FACTORS                                                         [ ] Prior episodes of VTE                                     (3 Points)                     [ ] Positive family history for VTE                      (3 Points)                  [ ] Prothrombin 91486 A                                     (3 Points)                     [ ] Factor V Leiden                                                (3 Points)                        [ ] Lupus anticoagulants                                      (3 Points)                                                           [ ] Anticardiolipin antibodies                              (3 Points)                                                       [ ] High homocysteine in the blood                   (3 Points)                                             [ ] Other congenital or acquired thrombophilia      (3 Points)                                                [ ] Heparin induced thrombocytopenia                  (3 Points)                                        MOBILITY RELATED FACTORS  [ ] Bed rest                                                         (1 Point)  [ ] Plaster cast                                                    (2 points)  [ ] Bed bound for more than 72 hours           (2 Points)    GENDER SPECIFIC FACTORS  [ ] Pregnancy or had a baby within the last month   (1 Point)  [ ] Post-partum < 6 weeks                                   (1 Point)  [ ] Hormonal therapy  or oral contraception   (1 Point)  [ ] History of pregnancy complications              (1 point)  [ ] Unexplained or recurrent              (1 Point)    OTHER RISK FACTORS                                           (1 Point)  [ ] BMI >40, smoking, diabetes requiring insulin, chemotherapy  blood transfusions and length of surgery over 2 hours    SURGERY RELATED RISK FACTORS  [ ]  Section within the last month     (1 Point)  [ ] Minor surgery                                                  (1 Point)  [ ] Arthroscopic surgery                                       (2 Points)  [ ] Planned major surgery lasting more            (2 Points)      than 45 minutes     x[ ] Elective hip or knee joint replacement       (5 points)       surgery                                                TRAUMA RELATED RISK FACTORS  [ ] Fracture of the hip, pelvis, or leg                       (5 Points)  [ ] Spinal cord injury resulting in paralysis             (5 points)       (in the previous month)    [ ] Paralysis  (less than 1 month)                             (5 Points)  [ ] Multiple Trauma within 1 month                        (5 Points)    Total Score [     8   ]    Caprini Score 0-2: Low Risk, NO VTE prophylaxis required for most patients, encourage ambulation  Caprini Score 3-6: Moderate Risk , pharmacologic VTE prophylaxis is indicated for most patients (in the absence of contraindications)  Caprini Score Greater than or =7: High risk, pharmocologic VTE prophylaxis indicated for most patients (in the absence of contraindications)

## 2024-12-16 NOTE — H&P PST ADULT - NSICDXPASTSURGICALHX_GEN_ALL_CORE_FT
PAST SURGICAL HISTORY:  H/O arthroscopy of right knee     H/O total knee replacement, right     History of PTCA 1     S/P arthroscopy of right shoulder

## 2024-12-17 LAB — VIT D25+D1,25 OH+D1,25 PNL SERPL-MCNC: 53.4 PG/ML — SIGNIFICANT CHANGE UP (ref 19.9–79.3)

## 2024-12-18 LAB — LEVETIRACETAM SERPL-MCNC: 18.5 UG/ML — SIGNIFICANT CHANGE UP (ref 10–40)

## 2024-12-19 DIAGNOSIS — M17.12 UNILATERAL PRIMARY OSTEOARTHRITIS, LEFT KNEE: ICD-10-CM

## 2024-12-30 ENCOUNTER — APPOINTMENT (OUTPATIENT)
Dept: CT IMAGING | Facility: CLINIC | Age: 64
End: 2024-12-30

## 2024-12-30 PROCEDURE — 73700 CT LOWER EXTREMITY W/O DYE: CPT | Mod: LT

## 2025-01-07 ENCOUNTER — APPOINTMENT (OUTPATIENT)
Dept: ORTHOPEDIC SURGERY | Facility: CLINIC | Age: 65
End: 2025-01-07
Payer: OTHER MISCELLANEOUS

## 2025-01-07 DIAGNOSIS — M17.12 UNILATERAL PRIMARY OSTEOARTHRITIS, LEFT KNEE: ICD-10-CM

## 2025-01-07 DIAGNOSIS — M23.92 UNSPECIFIED INTERNAL DERANGEMENT OF LEFT KNEE: ICD-10-CM

## 2025-01-07 DIAGNOSIS — Z96.651 PRESENCE OF RIGHT ARTIFICIAL KNEE JOINT: ICD-10-CM

## 2025-01-07 PROCEDURE — 99215 OFFICE O/P EST HI 40 MIN: CPT

## 2025-01-15 RX ORDER — OXYCODONE HCL 15 MG
10 TABLET ORAL
Refills: 0 | Status: DISCONTINUED | OUTPATIENT
Start: 2025-01-16 | End: 2025-01-17

## 2025-01-15 RX ORDER — BISACODYL 5 MG
10 TABLET, DELAYED RELEASE (ENTERIC COATED) ORAL ONCE
Refills: 0 | Status: DISCONTINUED | OUTPATIENT
Start: 2025-01-18 | End: 2025-01-17

## 2025-01-15 RX ORDER — METOPROLOL TARTRATE 50 MG
25 TABLET ORAL
Refills: 0 | Status: DISCONTINUED | OUTPATIENT
Start: 2025-01-16 | End: 2025-01-17

## 2025-01-15 RX ORDER — POLYETHYLENE GLYCOL 3350 17 G/DOSE
17 POWDER (GRAM) ORAL AT BEDTIME
Refills: 0 | Status: DISCONTINUED | OUTPATIENT
Start: 2025-01-16 | End: 2025-01-17

## 2025-01-15 RX ORDER — SODIUM CHLORIDE 9 MG/ML
1000 INJECTION, SOLUTION INTRAMUSCULAR; INTRAVENOUS; SUBCUTANEOUS
Refills: 0 | Status: DISCONTINUED | OUTPATIENT
Start: 2025-01-16 | End: 2025-01-17

## 2025-01-15 RX ORDER — GINKGO BILOBA 40 MG
3 CAPSULE ORAL AT BEDTIME
Refills: 0 | Status: DISCONTINUED | OUTPATIENT
Start: 2025-01-16 | End: 2025-01-17

## 2025-01-15 RX ORDER — B COMPLEX, C NO.20/FOLIC ACID 1 MG
1 CAPSULE ORAL DAILY
Refills: 0 | Status: DISCONTINUED | OUTPATIENT
Start: 2025-01-16 | End: 2025-01-17

## 2025-01-15 RX ORDER — LEVETIRACETAM 100 MG/ML
500 SOLUTION ORAL
Refills: 0 | Status: DISCONTINUED | OUTPATIENT
Start: 2025-01-16 | End: 2025-01-17

## 2025-01-15 RX ORDER — PANTOPRAZOLE 40 MG/1
40 TABLET, DELAYED RELEASE ORAL
Refills: 0 | Status: DISCONTINUED | OUTPATIENT
Start: 2025-01-16 | End: 2025-01-17

## 2025-01-15 RX ORDER — ACETAMINOPHEN 80 MG/.8ML
1000 SOLUTION/ DROPS ORAL EVERY 8 HOURS
Refills: 0 | Status: DISCONTINUED | OUTPATIENT
Start: 2025-01-16 | End: 2025-01-17

## 2025-01-15 RX ORDER — OXYCODONE HCL 15 MG
5 TABLET ORAL
Refills: 0 | Status: DISCONTINUED | OUTPATIENT
Start: 2025-01-16 | End: 2025-01-17

## 2025-01-15 RX ORDER — ONDANSETRON 4 MG/1
4 TABLET ORAL EVERY 6 HOURS
Refills: 0 | Status: DISCONTINUED | OUTPATIENT
Start: 2025-01-16 | End: 2025-01-17

## 2025-01-15 RX ORDER — TRAMADOL HYDROCHLORIDE 50 MG/1
50 TABLET ORAL EVERY 6 HOURS
Refills: 0 | Status: DISCONTINUED | OUTPATIENT
Start: 2025-01-16 | End: 2025-01-17

## 2025-01-15 RX ORDER — RIVAROXABAN 2.5 MG/1
10 TABLET, FILM COATED ORAL DAILY
Refills: 0 | Status: DISCONTINUED | OUTPATIENT
Start: 2025-01-17 | End: 2025-01-17

## 2025-01-15 RX ORDER — ATORVASTATIN CALCIUM 40 MG/1
20 TABLET, FILM COATED ORAL AT BEDTIME
Refills: 0 | Status: DISCONTINUED | OUTPATIENT
Start: 2025-01-16 | End: 2025-01-17

## 2025-01-15 RX ORDER — MAGNESIUM HYDROXIDE 400 MG/5ML
30 SUSPENSION, ORAL (FINAL DOSE FORM) ORAL DAILY
Refills: 0 | Status: DISCONTINUED | OUTPATIENT
Start: 2025-01-16 | End: 2025-01-17

## 2025-01-15 RX ORDER — CELECOXIB 200 MG
200 CAPSULE ORAL EVERY 12 HOURS
Refills: 0 | Status: DISCONTINUED | OUTPATIENT
Start: 2025-01-17 | End: 2025-01-17

## 2025-01-15 RX ORDER — SENNOSIDES 8.6 MG/1
2 TABLET, FILM COATED ORAL AT BEDTIME
Refills: 0 | Status: DISCONTINUED | OUTPATIENT
Start: 2025-01-16 | End: 2025-01-17

## 2025-01-15 RX ORDER — LOSARTAN POTASSIUM 100 MG/1
25 TABLET, FILM COATED ORAL DAILY
Refills: 0 | Status: DISCONTINUED | OUTPATIENT
Start: 2025-01-16 | End: 2025-01-17

## 2025-01-15 RX ORDER — ACETAMINOPHEN 80 MG/.8ML
1000 SOLUTION/ DROPS ORAL ONCE
Refills: 0 | Status: DISCONTINUED | OUTPATIENT
Start: 2025-01-16 | End: 2025-01-17

## 2025-01-16 ENCOUNTER — APPOINTMENT (OUTPATIENT)
Dept: ORTHOPEDIC SURGERY | Facility: HOSPITAL | Age: 65
End: 2025-01-16

## 2025-01-16 ENCOUNTER — TRANSCRIPTION ENCOUNTER (OUTPATIENT)
Age: 65
End: 2025-01-16

## 2025-01-16 ENCOUNTER — INPATIENT (INPATIENT)
Facility: HOSPITAL | Age: 65
LOS: 0 days | Discharge: HOME | End: 2025-01-17
Attending: ORTHOPAEDIC SURGERY | Admitting: ORTHOPAEDIC SURGERY
Payer: OTHER MISCELLANEOUS

## 2025-01-16 VITALS
DIASTOLIC BLOOD PRESSURE: 70 MMHG | WEIGHT: 220.02 LBS | SYSTOLIC BLOOD PRESSURE: 114 MMHG | OXYGEN SATURATION: 97 % | TEMPERATURE: 98 F | HEIGHT: 67 IN | HEART RATE: 61 BPM | RESPIRATION RATE: 16 BRPM

## 2025-01-16 DIAGNOSIS — Z98.890 OTHER SPECIFIED POSTPROCEDURAL STATES: Chronic | ICD-10-CM

## 2025-01-16 DIAGNOSIS — Z96.651 PRESENCE OF RIGHT ARTIFICIAL KNEE JOINT: Chronic | ICD-10-CM

## 2025-01-16 DIAGNOSIS — Z96.652 PRESENCE OF LEFT ARTIFICIAL KNEE JOINT: ICD-10-CM

## 2025-01-16 DIAGNOSIS — Z98.61 CORONARY ANGIOPLASTY STATUS: Chronic | ICD-10-CM

## 2025-01-16 LAB
ANION GAP SERPL CALC-SCNC: 4 MMOL/L — LOW (ref 5–17)
BUN SERPL-MCNC: 13 MG/DL — SIGNIFICANT CHANGE UP (ref 7–23)
CALCIUM SERPL-MCNC: 9 MG/DL — SIGNIFICANT CHANGE UP (ref 8.5–10.1)
CHLORIDE SERPL-SCNC: 108 MMOL/L — SIGNIFICANT CHANGE UP (ref 96–108)
CO2 SERPL-SCNC: 25 MMOL/L — SIGNIFICANT CHANGE UP (ref 22–31)
CREAT SERPL-MCNC: 0.98 MG/DL — SIGNIFICANT CHANGE UP (ref 0.5–1.3)
EGFR: 87 ML/MIN/1.73M2 — SIGNIFICANT CHANGE UP
GLUCOSE BLDC GLUCOMTR-MCNC: 155 MG/DL — HIGH (ref 70–99)
GLUCOSE SERPL-MCNC: 121 MG/DL — HIGH (ref 70–99)
HCT VFR BLD CALC: 44.1 % — SIGNIFICANT CHANGE UP (ref 39–50)
HGB BLD-MCNC: 14.8 G/DL — SIGNIFICANT CHANGE UP (ref 13–17)
MCHC RBC-ENTMCNC: 30.3 PG — SIGNIFICANT CHANGE UP (ref 27–34)
MCHC RBC-ENTMCNC: 33.6 G/DL — SIGNIFICANT CHANGE UP (ref 32–36)
MCV RBC AUTO: 90.4 FL — SIGNIFICANT CHANGE UP (ref 80–100)
NRBC # BLD: 0 /100 WBCS — SIGNIFICANT CHANGE UP (ref 0–0)
PLATELET # BLD AUTO: 191 K/UL — SIGNIFICANT CHANGE UP (ref 150–400)
POTASSIUM SERPL-MCNC: 4.5 MMOL/L — SIGNIFICANT CHANGE UP (ref 3.5–5.3)
POTASSIUM SERPL-SCNC: 4.5 MMOL/L — SIGNIFICANT CHANGE UP (ref 3.5–5.3)
RBC # BLD: 4.88 M/UL — SIGNIFICANT CHANGE UP (ref 4.2–5.8)
RBC # FLD: 13.3 % — SIGNIFICANT CHANGE UP (ref 10.3–14.5)
SODIUM SERPL-SCNC: 137 MMOL/L — SIGNIFICANT CHANGE UP (ref 135–145)
WBC # BLD: 10.97 K/UL — HIGH (ref 3.8–10.5)
WBC # FLD AUTO: 10.97 K/UL — HIGH (ref 3.8–10.5)

## 2025-01-16 PROCEDURE — 27447 TOTAL KNEE ARTHROPLASTY: CPT | Mod: LT

## 2025-01-16 PROCEDURE — 20985 CPTR-ASST DIR MS PX: CPT

## 2025-01-16 PROCEDURE — 73560 X-RAY EXAM OF KNEE 1 OR 2: CPT | Mod: 26,LT

## 2025-01-16 PROCEDURE — 27447 TOTAL KNEE ARTHROPLASTY: CPT | Mod: AS,LT

## 2025-01-16 DEVICE — PATELLA TRIATHLON ASSYM SZ A3X10MM: Type: IMPLANTABLE DEVICE | Site: LEFT | Status: FUNCTIONAL

## 2025-01-16 DEVICE — BASEPLATE TIB UNIV TRIATHLON SZ 4: Type: IMPLANTABLE DEVICE | Site: LEFT | Status: FUNCTIONAL

## 2025-01-16 DEVICE — COMP FEM CR CMNTLSS BEADED W/ PA SZ 4 LT: Type: IMPLANTABLE DEVICE | Site: LEFT | Status: FUNCTIONAL

## 2025-01-16 DEVICE — MAKO BONE PIN 3.2MM X 110MM: Type: IMPLANTABLE DEVICE | Site: LEFT | Status: FUNCTIONAL

## 2025-01-16 DEVICE — INSERT TIB BEARING CS X3 SZ 4 10MM: Type: IMPLANTABLE DEVICE | Site: LEFT | Status: FUNCTIONAL

## 2025-01-16 DEVICE — CEMENT SIMPLEX P 40GM: Type: IMPLANTABLE DEVICE | Site: LEFT | Status: FUNCTIONAL

## 2025-01-16 DEVICE — INSERT TIB BEARING CS X3 SZ 4 11MM: Type: IMPLANTABLE DEVICE | Site: LEFT | Status: FUNCTIONAL

## 2025-01-16 DEVICE — MAKO BONE PIN 3.2MM X 140MM: Type: IMPLANTABLE DEVICE | Site: LEFT | Status: FUNCTIONAL

## 2025-01-16 RX ORDER — CELECOXIB 200 MG
200 CAPSULE ORAL ONCE
Refills: 0 | Status: COMPLETED | OUTPATIENT
Start: 2025-01-16 | End: 2025-01-16

## 2025-01-16 RX ORDER — ONDANSETRON 4 MG/1
4 TABLET ORAL ONCE
Refills: 0 | Status: DISCONTINUED | OUTPATIENT
Start: 2025-01-16 | End: 2025-01-16

## 2025-01-16 RX ORDER — GABAPENTIN 300 MG/1
300 CAPSULE ORAL THREE TIMES A DAY
Refills: 0 | Status: DISCONTINUED | OUTPATIENT
Start: 2025-01-16 | End: 2025-01-16

## 2025-01-16 RX ORDER — ACETAMINOPHEN 80 MG/.8ML
650 SOLUTION/ DROPS ORAL ONCE
Refills: 0 | Status: COMPLETED | OUTPATIENT
Start: 2025-01-16 | End: 2025-01-16

## 2025-01-16 RX ORDER — CEFAZOLIN SODIUM 1 G
2000 VIAL (EA) INJECTION EVERY 8 HOURS
Refills: 0 | Status: COMPLETED | OUTPATIENT
Start: 2025-01-16 | End: 2025-01-17

## 2025-01-16 RX ORDER — GABAPENTIN 300 MG/1
900 CAPSULE ORAL AT BEDTIME
Refills: 0 | Status: DISCONTINUED | OUTPATIENT
Start: 2025-01-17 | End: 2025-01-17

## 2025-01-16 RX ORDER — FENTANYL 75 UG/H
50 PATCH, EXTENDED RELEASE TRANSDERMAL ONCE
Refills: 0 | Status: DISCONTINUED | OUTPATIENT
Start: 2025-01-16 | End: 2025-01-16

## 2025-01-16 RX ORDER — FENTANYL 75 UG/H
25 PATCH, EXTENDED RELEASE TRANSDERMAL ONCE
Refills: 0 | Status: DISCONTINUED | OUTPATIENT
Start: 2025-01-16 | End: 2025-01-16

## 2025-01-16 RX ORDER — SODIUM CHLORIDE 9 MG/ML
500 INJECTION, SOLUTION INTRAMUSCULAR; INTRAVENOUS; SUBCUTANEOUS ONCE
Refills: 0 | Status: COMPLETED | OUTPATIENT
Start: 2025-01-16 | End: 2025-01-16

## 2025-01-16 RX ORDER — DEXAMETHASONE SODIUM PHOSPHATE 4 MG/ML
10 VIAL (ML) INJECTION ONCE
Refills: 0 | Status: COMPLETED | OUTPATIENT
Start: 2025-01-17 | End: 2025-01-17

## 2025-01-16 RX ORDER — GABAPENTIN 300 MG/1
600 CAPSULE ORAL ONCE
Refills: 0 | Status: COMPLETED | OUTPATIENT
Start: 2025-01-16 | End: 2025-01-17

## 2025-01-16 RX ORDER — SODIUM CHLORIDE 9 MG/ML
1000 INJECTION, SOLUTION INTRAVENOUS
Refills: 0 | Status: DISCONTINUED | OUTPATIENT
Start: 2025-01-16 | End: 2025-01-16

## 2025-01-16 RX ADMIN — GABAPENTIN 300 MILLIGRAM(S): 300 CAPSULE ORAL at 20:26

## 2025-01-16 RX ADMIN — Medication 100 MILLIGRAM(S): at 18:44

## 2025-01-16 RX ADMIN — SODIUM CHLORIDE 500 MILLILITER(S): 9 INJECTION, SOLUTION INTRAMUSCULAR; INTRAVENOUS; SUBCUTANEOUS at 11:56

## 2025-01-16 RX ADMIN — Medication 5 MILLIGRAM(S): at 17:41

## 2025-01-16 RX ADMIN — Medication 200 MILLIGRAM(S): at 08:54

## 2025-01-16 RX ADMIN — Medication 5 MILLIGRAM(S): at 22:50

## 2025-01-16 RX ADMIN — ACETAMINOPHEN 650 MILLIGRAM(S): 80 SOLUTION/ DROPS ORAL at 08:54

## 2025-01-16 RX ADMIN — ACETAMINOPHEN 1000 MILLIGRAM(S): 80 SOLUTION/ DROPS ORAL at 15:46

## 2025-01-16 RX ADMIN — LEVETIRACETAM 500 MILLIGRAM(S): 100 SOLUTION ORAL at 18:45

## 2025-01-16 RX ADMIN — Medication 17 GRAM(S): at 21:47

## 2025-01-16 RX ADMIN — ACETAMINOPHEN 1000 MILLIGRAM(S): 80 SOLUTION/ DROPS ORAL at 22:48

## 2025-01-16 RX ADMIN — Medication 5 MILLIGRAM(S): at 16:41

## 2025-01-16 RX ADMIN — ACETAMINOPHEN 1000 MILLIGRAM(S): 80 SOLUTION/ DROPS ORAL at 21:48

## 2025-01-16 RX ADMIN — ACETAMINOPHEN 1000 MILLIGRAM(S): 80 SOLUTION/ DROPS ORAL at 14:46

## 2025-01-16 RX ADMIN — Medication 25 MILLIGRAM(S): at 18:44

## 2025-01-16 RX ADMIN — SENNOSIDES 2 TABLET(S): 8.6 TABLET, FILM COATED ORAL at 21:48

## 2025-01-16 RX ADMIN — ATORVASTATIN CALCIUM 20 MILLIGRAM(S): 40 TABLET, FILM COATED ORAL at 21:47

## 2025-01-16 RX ADMIN — SODIUM CHLORIDE 75 MILLILITER(S): 9 INJECTION, SOLUTION INTRAVENOUS at 11:52

## 2025-01-16 RX ADMIN — Medication 5 MILLIGRAM(S): at 21:50

## 2025-01-16 NOTE — DISCHARGE NOTE PROVIDER - NSDCMRMEDTOKEN_GEN_ALL_CORE_FT
aspirin 81 mg oral delayed release tablet: 1 tab(s) orally 2 times a day for 30 days MDD: 2 tablets  atorvastatin 20 mg oral tablet: 1 tab(s) orally once a day  Gabapentin: 1 tablet with sensor orally 3 times a day  Keppra 500 mg oral tablet: 1 tab(s) orally 2 times a day  metoprolol succinate 25 mg oral capsule, extended release: 1 cap(s) orally 2 times a day  olmesartan: 10 milligram(s) once a day   aspirin 81 mg oral delayed release tablet: 1 tab(s) orally once a day for 30 days MDD: 2 tablets  atorvastatin 20 mg oral tablet: 1 tab(s) orally once a day  Gabapentin: 3 tab(s) orally once a day (at bedtime) 300mg tab  Keppra 500 mg oral tablet: 1 tab(s) orally 2 times a day  metoprolol succinate 25 mg oral capsule, extended release: 1 cap(s) orally 2 times a day  olmesartan: 10 milligram(s) once a day   acetaminophen 500 mg oral tablet: 2 tab(s) orally every 8 hours  aspirin 81 mg oral delayed release tablet: 1 tab(s) orally once a day resume home dose baby aspirin. When xarelto is completed take REGULAR aspirin 325mg once a day for 30 days. MDD: 2 tablets  atorvastatin 20 mg oral tablet: 1 tab(s) orally once a day  celecoxib 200 mg oral capsule: 1 cap(s) orally every 12 hours  Gabapentin: 3 tab(s) orally once a day (at bedtime) 300mg tab  Keppra 500 mg oral tablet: 1 tab(s) orally 2 times a day  metoprolol succinate 25 mg oral capsule, extended release: 1 cap(s) orally 2 times a day  Narcan 4 mg/0.1 mL nasal spray: 4 milligram(s) intranasally once , repeat as necessary.   As needed. For suspected opiate overdose   Follow instructions on packet MDD: 0.2 ml  olmesartan: 10 milligram(s) orally once a day  oxyCODONE 10 mg oral tablet: 1 tab(s) orally every 4 hours MDD: 6  pantoprazole 40 mg oral delayed release tablet: 1 tab(s) orally once a day (before a meal) MDD: 1  polyethylene glycol 3350 oral powder for reconstitution: 17 gram(s) orally once a day (at bedtime)  senna leaf extract oral tablet: 2 tab(s) orally once a day (at bedtime)  Xarelto 10 mg oral tablet: 1 tab(s) orally once a day MDD: 1

## 2025-01-16 NOTE — DISCHARGE NOTE PROVIDER - NSDCFUADDAPPT_GEN_ALL_CORE_FT

## 2025-01-16 NOTE — OCCUPATIONAL THERAPY INITIAL EVALUATION ADULT - GENERAL OBSERVATIONS, REHAB EVAL
Pt encountered semi supine in bed, NAD, all lines intact, pt reported  pain 3/10 s/p Left TKA, pt agreeable to OT eval, PT Cliff present.

## 2025-01-16 NOTE — DISCHARGE NOTE NURSING/CASE MANAGEMENT/SOCIAL WORK - FINANCIAL ASSISTANCE
[facility] provides services at a reduced cost to those who are determined to be eligible through [facility]’s financial assistance program. Information regarding [facility]’s financial assistance program can be found by going to [email or web address] or by calling [phone].

## 2025-01-16 NOTE — CONSULT NOTE ADULT - SUBJECTIVE AND OBJECTIVE BOX
GERSON GARCIA is a 64y Male s/p ROBOTIC ASSISTED LEFT TOTAL KNEE ARTHROPLASTY WITH RADHA      w/ h/o Seizure disorder    CAD (coronary artery disease)    HTN (hypertension)    HLD (hyperlipidemia)    MVP (mitral valve prolapse)      denies any chest pain shortness of breath palpitation dizziness lightheadedness nausea vomiting fever or chills    H/O arthroscopy of right knee    S/P arthroscopy of right shoulder    History of PTCA 1    H/O total knee replacement, right        SH: doesnot smoke or drink at this time    No Known Allergies    acetaminophen     Tablet .. 1000 milliGRAM(s) Oral every 8 hours  acetaminophen   IVPB .. 1000 milliGRAM(s) IV Intermittent once  atorvastatin 20 milliGRAM(s) Oral at bedtime  ceFAZolin   IVPB 2000 milliGRAM(s) IV Intermittent every 8 hours  gabapentin 300 milliGRAM(s) Oral three times a day  levETIRAcetam 500 milliGRAM(s) Oral two times a day  losartan 25 milliGRAM(s) Oral daily  magnesium hydroxide Suspension 30 milliLiter(s) Oral daily PRN  melatonin 3 milliGRAM(s) Oral at bedtime PRN  metoprolol succinate ER 25 milliGRAM(s) Oral two times a day  multivitamin 1 Tablet(s) Oral daily  ondansetron Injectable 4 milliGRAM(s) IV Push every 6 hours PRN  oxyCODONE    IR 5 milliGRAM(s) Oral every 3 hours PRN  oxyCODONE    IR 10 milliGRAM(s) Oral every 3 hours PRN  pantoprazole    Tablet 40 milliGRAM(s) Oral before breakfast  polyethylene glycol 3350 17 Gram(s) Oral at bedtime  senna 2 Tablet(s) Oral at bedtime  sodium chloride 0.9%. 1000 milliLiter(s) IV Continuous <Continuous>  traMADol 50 milliGRAM(s) Oral every 6 hours PRN    T(C): 36.6 (01-16-25 @ 19:30), Max: 36.8 (01-16-25 @ 08:43)  HR: 79 (01-16-25 @ 19:30) (59 - 80)  BP: 129/78 (01-16-25 @ 19:30) (91/51 - 129/78)  RR: 14 (01-16-25 @ 19:30) (13 - 17)  SpO2: 95% (01-16-25 @ 19:30) (94% - 99%)  HEENT unremarkable  neck no JVD or bruit  heart normal S1 S2 RRR no gallops or rubs  chest clear to auscultation  abd sof nontender non distended +bs  ext no calf tenderness    A/P   DVT PX  pain control  bowel regimen   wound care as per ortho  GI PX  antiemetics prn  incentive spirometer

## 2025-01-16 NOTE — DISCHARGE NOTE PROVIDER - NSDCFUADDINST_GEN_ALL_CORE_FT
Keep LAURENCE Dressing Clean, Dry and Intact. May shower with LAURENCE Dressing. Please do not scrub, soak, peel or pick at the LAURENCE dressing. No creams, lotions, or oils over dressing. May shower and let water run over dressing, no baths. Pat dry once out of shower. Dressing to be removed in 7 days. If dressing is saturated from border to border - may remove and replace with clean dry dressing.    Shower instructions for LAURENCE Dressing: Place battery pack in a water sealed bag (such as a Ziplock bag) and keep battery out of the water.   Alternately you can turn battery pack off (press orange button.) Twist tubing OFF battery pack before entering shower. Once done with showering. Pat dressing dry. Reconnect tubing and twist ON battery pack after you are dry. Then turn battery pack on (press orange button.)   Keep knee straight while at rest. Elevate the leg as much as possible ("toes above the nose") to help control swelling. Make sure you get up and take a brief walk every two hours to help with circulation and prevent stiffness. Incentive spirometer 10X/hour. Cryocuff to help with pain/inflammation.   **once your Xarelto Prescription complete, please take 1 Aspirin 325mg daily**

## 2025-01-16 NOTE — DISCHARGE NOTE PROVIDER - HOSPITAL COURSE
64yMale with history of osteoarthritis of left knee presenting for left TKA by Dr. Ebenezer Gilbert on 1/16/25. Risk and benefits of surgery were explained to the patient. The patient understood and agreed to proceed with surgery. Patient underwent the procedure with no intraoperative complications. Pt was brought in stable condition to the PACU. Once stable in PACU, pt was brought to the floor. During hospital stay pt was followed by Medicine,  during this admission. Pt hospital course was XX. Pt is stable for discharge to XX on POD# 64yMale with history of osteoarthritis of left knee presenting for left TKA by Dr. Ebenezer Gilbert on 1/16/25. Risk and benefits of surgery were explained to the patient. The patient understood and agreed to proceed with surgery. Patient underwent the procedure with no intraoperative complications. Pt was brought in stable condition to the PACU. Once stable in PACU, pt was brought to the floor. During hospital stay pt was followed by Medicine,  during this admission. Pt hospital course was unremarkable. Pt is stable for discharge to home on POD# 1.

## 2025-01-16 NOTE — PHYSICAL THERAPY INITIAL EVALUATION ADULT - GENERAL OBSERVATIONS, REHAB EVAL
Chart (EMR) reviewed. Received supine c HOB elevated, NAD. OT Stanley present, + LAURENCE drain, c/o pain 4/10, cooperative.

## 2025-01-16 NOTE — PHYSICAL THERAPY INITIAL EVALUATION ADULT - ADDITIONAL COMMENTS
Confirmed from pre-op notes: Pt lives c spouse in a private house with 3 entry steps with no hand rails. Once inside, pt has to negotiate a flight of stairs with 13 steps, right ascending handrail to access the bedroom and bathroom. The home is equipped with 2 bathrooms one located on each level. The bathroom upstairs has walk in shower combination, fixed shower and standard toilet. The bathroom at the main level has a tub/shower combination, grab bars, fixed / retractable shower head and comfort height toilet with adequate space to fit a commode over it. Pt was Independent with all ADL's and ambulation without device. Patient has own straight cane, rolling walker, and 3:1 commode.

## 2025-01-16 NOTE — DISCHARGE NOTE NURSING/CASE MANAGEMENT/SOCIAL WORK - NSDCFUADDAPPT_GEN_ALL_CORE_FT

## 2025-01-16 NOTE — PHYSICAL THERAPY INITIAL EVALUATION ADULT - NSPTDMEREC_GEN_A_CORE
Patient has own straight cane, rolling walker, and 3:1 commode and a pair of crutches/axillary crutches

## 2025-01-16 NOTE — OCCUPATIONAL THERAPY INITIAL EVALUATION ADULT - ADDITIONAL COMMENTS
Pt lives in a private house with 3 entry steps with no hand rails that cannot be reached simultaneously. Once inside, pt has to negotiate a flight of stairs with 15 steps, left ascending handrail to access the bedroom and bathroom. The home is equipped with 2 bathrooms one located on each level. The bathroom upstairs has walk in shower combination, fixed shower and standard toilet. The bathroom at the main level has a tub/shower combination, grab bars, fixed / retractable shower head and comfort height toilet with adequate space to fit a commode over

## 2025-01-16 NOTE — DISCHARGE NOTE NURSING/CASE MANAGEMENT/SOCIAL WORK - PATIENT PORTAL LINK FT
You can access the FollowMyHealth Patient Portal offered by [facility] by registering at the following website: [email or web address]. By joining [facility]’s FollowMyHealth portal, you will also be able to view your health information using other applications (apps) compatible with our system.

## 2025-01-16 NOTE — PHYSICAL THERAPY INITIAL EVALUATION ADULT - GAIT TRAINING, PT EVAL
Pt will improve ambulation to ~300 feet Independently using rolling walker within 2 weeks. Pt will negotiate ~15 steps c rail Independently within 2 weeks.

## 2025-01-16 NOTE — PHYSICAL THERAPY INITIAL EVALUATION ADULT - RANGE OF MOTION EXAMINATION, REHAB EVAL
L knee flexion 75 degrees, extension - 10 degrees./Right LE ROM was WFL (within functional limits)/deficits as listed below

## 2025-01-16 NOTE — DISCHARGE NOTE PROVIDER - NSDCFUSCHEDAPPT_GEN_ALL_CORE_FT
Ebenezer Gilbert  [facility]  [facility] [address]  Scheduled Appointment: 01/29/2025

## 2025-01-16 NOTE — DISCHARGE NOTE PROVIDER - NSDCCPCAREPLAN_GEN_ALL_CORE_FT
PRINCIPAL DISCHARGE DIAGNOSIS  Diagnosis: Osteoarthritis of left hip  Assessment and Plan of Treatment:      PRINCIPAL DISCHARGE DIAGNOSIS  Diagnosis: OA (osteoarthritis) of knee  Assessment and Plan of Treatment:

## 2025-01-16 NOTE — PHYSICAL THERAPY INITIAL EVALUATION ADULT - PLANNED THERAPY INTERVENTIONS, PT EVAL
To be able to perform stair negotiation with B crutches device and no hand rails Independently to improve access and exiting from home and access to bedrooms, basement and bathrooms by 2 weeks/balance training/bed mobility training/gait training/ROM/strengthening/transfer training

## 2025-01-16 NOTE — DISCHARGE NOTE PROVIDER - CARE PROVIDER_API CALL
Ebenezer Gilbert  Orthopaedic Surgery  [address]  [place] [address]  Phone: [phone]  Fax: [phone]  Follow Up Time:

## 2025-01-17 VITALS
SYSTOLIC BLOOD PRESSURE: 127 MMHG | TEMPERATURE: 97 F | OXYGEN SATURATION: 99 % | HEART RATE: 63 BPM | DIASTOLIC BLOOD PRESSURE: 79 MMHG | RESPIRATION RATE: 17 BRPM

## 2025-01-17 PROBLEM — Z96.652 STATUS POST TOTAL LEFT KNEE REPLACEMENT: Status: ACTIVE | Noted: 2025-01-17

## 2025-01-17 LAB
ANION GAP SERPL CALC-SCNC: 5 MMOL/L — SIGNIFICANT CHANGE UP (ref 5–17)
BUN SERPL-MCNC: 15 MG/DL — SIGNIFICANT CHANGE UP (ref 7–23)
CALCIUM SERPL-MCNC: 9.1 MG/DL — SIGNIFICANT CHANGE UP (ref 8.5–10.1)
CHLORIDE SERPL-SCNC: 107 MMOL/L — SIGNIFICANT CHANGE UP (ref 96–108)
CO2 SERPL-SCNC: 26 MMOL/L — SIGNIFICANT CHANGE UP (ref 22–31)
CREAT SERPL-MCNC: 1.06 MG/DL — SIGNIFICANT CHANGE UP (ref 0.5–1.3)
EGFR: 79 ML/MIN/1.73M2 — SIGNIFICANT CHANGE UP
GLUCOSE SERPL-MCNC: 152 MG/DL — HIGH (ref 70–99)
HCT VFR BLD CALC: 39.5 % — SIGNIFICANT CHANGE UP (ref 39–50)
HGB BLD-MCNC: 13.1 G/DL — SIGNIFICANT CHANGE UP (ref 13–17)
MCHC RBC-ENTMCNC: 30.5 PG — SIGNIFICANT CHANGE UP (ref 27–34)
MCHC RBC-ENTMCNC: 33.2 G/DL — SIGNIFICANT CHANGE UP (ref 32–36)
MCV RBC AUTO: 92.1 FL — SIGNIFICANT CHANGE UP (ref 80–100)
NRBC # BLD: 0 /100 WBCS — SIGNIFICANT CHANGE UP (ref 0–0)
PLATELET # BLD AUTO: 203 K/UL — SIGNIFICANT CHANGE UP (ref 150–400)
POTASSIUM SERPL-MCNC: 4.8 MMOL/L — SIGNIFICANT CHANGE UP (ref 3.5–5.3)
POTASSIUM SERPL-SCNC: 4.8 MMOL/L — SIGNIFICANT CHANGE UP (ref 3.5–5.3)
RBC # BLD: 4.29 M/UL — SIGNIFICANT CHANGE UP (ref 4.2–5.8)
RBC # FLD: 13.3 % — SIGNIFICANT CHANGE UP (ref 10.3–14.5)
SODIUM SERPL-SCNC: 138 MMOL/L — SIGNIFICANT CHANGE UP (ref 135–145)
WBC # BLD: 18.58 K/UL — HIGH (ref 3.8–10.5)
WBC # FLD AUTO: 18.58 K/UL — HIGH (ref 3.8–10.5)

## 2025-01-17 RX ORDER — SENNOSIDES 8.6 MG/1
2 TABLET, FILM COATED ORAL
Qty: 0 | Refills: 0 | DISCHARGE
Start: 2025-01-17

## 2025-01-17 RX ORDER — NALOXONE HCL 0.4 MG/ML
4 VIAL (ML) INJECTION
Qty: 1 | Refills: 0
Start: 2025-01-17 | End: 2025-01-17

## 2025-01-17 RX ORDER — ACETAMINOPHEN 80 MG/.8ML
2 SOLUTION/ DROPS ORAL
Qty: 0 | Refills: 0 | DISCHARGE
Start: 2025-01-17

## 2025-01-17 RX ORDER — CELECOXIB 200 MG
1 CAPSULE ORAL
Qty: 60 | Refills: 0
Start: 2025-01-17 | End: 2025-02-15

## 2025-01-17 RX ORDER — RIVAROXABAN 2.5 MG/1
1 TABLET, FILM COATED ORAL
Qty: 14 | Refills: 0
Start: 2025-01-17 | End: 2025-01-30

## 2025-01-17 RX ORDER — OXYCODONE HCL 15 MG
1 TABLET ORAL
Qty: 42 | Refills: 0
Start: 2025-01-17 | End: 2025-01-23

## 2025-01-17 RX ORDER — POLYETHYLENE GLYCOL 3350 17 G/DOSE
17 POWDER (GRAM) ORAL
Qty: 0 | Refills: 0 | DISCHARGE
Start: 2025-01-17

## 2025-01-17 RX ORDER — PANTOPRAZOLE 40 MG/1
1 TABLET, DELAYED RELEASE ORAL
Qty: 30 | Refills: 0
Start: 2025-01-17 | End: 2025-02-15

## 2025-01-17 RX ADMIN — Medication 100 MILLIGRAM(S): at 02:33

## 2025-01-17 RX ADMIN — Medication 5 MILLIGRAM(S): at 10:00

## 2025-01-17 RX ADMIN — Medication 25 MILLIGRAM(S): at 06:15

## 2025-01-17 RX ADMIN — Medication 200 MILLIGRAM(S): at 06:51

## 2025-01-17 RX ADMIN — LOSARTAN POTASSIUM 25 MILLIGRAM(S): 100 TABLET, FILM COATED ORAL at 06:15

## 2025-01-17 RX ADMIN — Medication 102 MILLIGRAM(S): at 06:15

## 2025-01-17 RX ADMIN — GABAPENTIN 600 MILLIGRAM(S): 300 CAPSULE ORAL at 02:21

## 2025-01-17 RX ADMIN — ACETAMINOPHEN 1000 MILLIGRAM(S): 80 SOLUTION/ DROPS ORAL at 06:51

## 2025-01-17 RX ADMIN — ACETAMINOPHEN 1000 MILLIGRAM(S): 80 SOLUTION/ DROPS ORAL at 06:15

## 2025-01-17 RX ADMIN — RIVAROXABAN 10 MILLIGRAM(S): 2.5 TABLET, FILM COATED ORAL at 11:31

## 2025-01-17 RX ADMIN — LEVETIRACETAM 500 MILLIGRAM(S): 100 SOLUTION ORAL at 06:16

## 2025-01-17 RX ADMIN — Medication 5 MILLIGRAM(S): at 09:09

## 2025-01-17 RX ADMIN — PANTOPRAZOLE 40 MILLIGRAM(S): 40 TABLET, DELAYED RELEASE ORAL at 06:15

## 2025-01-17 RX ADMIN — Medication 200 MILLIGRAM(S): at 06:15

## 2025-01-17 RX ADMIN — Medication 1 TABLET(S): at 11:31

## 2025-01-17 NOTE — PROGRESS NOTE ADULT - SUBJECTIVE AND OBJECTIVE BOX
GERSON GARCIA is a 64y Male s/p ROBOTIC ASSISTED LEFT TOTAL KNEE ARTHROPLASTY WITH RADHA        denies any chest pain shortness of breath palpitation dizziness lightheadedness nausea vomiting fever or chills    T(C): 36.8 (01-17-25 @ 09:24), Max: 36.8 (01-17-25 @ 09:24)  HR: 61 (01-17-25 @ 09:24) (59 - 80)  BP: 126/82 (01-17-25 @ 09:24) (91/51 - 129/78)  RR: 18 (01-17-25 @ 09:24) (13 - 18)  SpO2: 99% (01-17-25 @ 09:24) (94% - 99%)  no jvd/bruit  s1 s2 rrr  cta  s/nt/nd  no calf tend                        13.1   18.58 )-----------( 203      ( 17 Jan 2025 05:55 )             39.5   01-17    138  |  107  |  15  ----------------------------<  152[H]  4.8   |  26  |  1.06    Ca    9.1      17 Jan 2025 05:55        cont dvt px  pain control  bowel regimen  antiemetics  incentive spirometer
Patient is seen and examined at bedside. Denies CP/SOB/Dizziness/N/V/D/HA. Pain is controlled.     Vital Signs Last 24 Hrs  T(C): 36.8 (17 Jan 2025 09:24), Max: 36.8 (17 Jan 2025 09:24)  T(F): 98.3 (17 Jan 2025 09:24), Max: 98.3 (17 Jan 2025 09:24)  HR: 61 (17 Jan 2025 09:24) (59 - 80)  BP: 126/82 (17 Jan 2025 09:24) (91/51 - 129/78)  BP(mean): 67 (16 Jan 2025 11:57) (63 - 84)  RR: 18 (17 Jan 2025 09:24) (13 - 18)  SpO2: 99% (17 Jan 2025 09:24) (94% - 99%)    Parameters below as of 17 Jan 2025 09:24  Patient On (Oxygen Delivery Method): room air          PHYSICAL EXAM:  General: NAD  Neuro:  Alert & responsive  HEENT: NCAT, EOMI, conjunctiva clear  abd: soft, NT/ND  Right LE: Motor intact + EHL/FHL/TA/GS.  Sensation is grossly intact.  Extremity warm, compartments soft, compressible. No calf tenderness. DP 2+   Left LE: LAURENCE dressing C/D/I. Battery flashing green/ok. Motor intact +EHL/FHL/TA/GS. Sensation is grossly intact. Extremity warm, compartments soft, compressible. No calf tenderness. DP2+    Labs:                          13.1   18.58 )-----------( 203      ( 17 Jan 2025 05:55 )             39.5       01-17    138  |  107  |  15  ----------------------------<  152[H]  4.8   |  26  |  1.06    Ca    9.1      17 Jan 2025 05:55        A/P: Patient is a 64y y/o Male s/p left TKA, POD # 1  -wound care, knee extension/leg elevation, cryocuff, isometric exercises, new medications reviewed with pt  -Pain control/analgesia reviewed   -Inc spirometry reviewed with pt, demonstrated competence  -DVT prophylaxis with Venodynes/Xarelto + home baby asa (to take aspirin 325mg daily X 30 days when xarelto completed)   -Pt requires a rolling walker for MRADLs at home  -PT/OT/WBAT  -medical consult reviewed   -DC planning: for home today  -Dr. Gilbert updated  
Patient is s/p L TKA under spinal anesthesia POD#0. Pt tolerated procedure well without any intra-op complications. Pt doing well at this time.  Denies CP/SOB/Dizziness/N/V/D/HA. Pain is controlled.     Vital Signs Last 24 Hrs  T(C): 36.4 (16 Jan 2025 15:30), Max: 36.8 (16 Jan 2025 08:43)  T(F): 97.5 (16 Jan 2025 15:30), Max: 98.3 (16 Jan 2025 08:43)  HR: 67 (16 Jan 2025 15:30) (59 - 75)  BP: 101/66 (16 Jan 2025 15:30) (91/51 - 114/70)  BP(mean): 67 (16 Jan 2025 11:57) (63 - 84)  RR: 16 (16 Jan 2025 15:30) (13 - 16)  SpO2: 94% (16 Jan 2025 15:30) (94% - 99%)    Parameters below as of 16 Jan 2025 15:30  Patient On (Oxygen Delivery Method): room air        GEN: NAD  L Knee: Dressing C/D/I.   B/L LE's: Motor intact + EHL/FHL/TA/GS in the BL LE. Sensation is grossly intact B/L. Extremities warm B/L. Compartments soft, compressible B/L, no calf tenderness B/L. DP 2+ B/L.    Labs:                          14.8   10.97 )-----------( 191      ( 16 Jan 2025 11:57 )             44.1       01-16    137  |  108  |  13  ----------------------------<  121[H]  4.5   |  25  |  0.98    Ca    9.0      16 Jan 2025 11:57        A/P: Patient is a 64y y/o Male s/p L TKA, POD #0  -wound care, isometric exercises, GI motility, new medications, hospital course and discharge planning reviewed with pt  -Pain control/analgesia  -Inc spirometry reviewed and counseled  -SCD's to B/L LE  -F/U AM Labs  -PT/OT/WBAT  -Antibiotic 24hours post-op  -Anticoagulation: XA starting POD#1  Discharge: Pending  Pt requires rolling walker to perform MRADLs at home.

## 2025-01-23 DIAGNOSIS — M17.9 OSTEOARTHRITIS OF KNEE, UNSPECIFIED: ICD-10-CM

## 2025-01-23 DIAGNOSIS — I10 ESSENTIAL (PRIMARY) HYPERTENSION: ICD-10-CM

## 2025-01-23 DIAGNOSIS — G40.909 EPILEPSY, UNSPECIFIED, NOT INTRACTABLE, WITHOUT STATUS EPILEPTICUS: ICD-10-CM

## 2025-01-23 DIAGNOSIS — I25.10 ATHEROSCLEROTIC HEART DISEASE OF NATIVE CORONARY ARTERY WITHOUT ANGINA PECTORIS: ICD-10-CM

## 2025-01-23 DIAGNOSIS — I34.1 NONRHEUMATIC MITRAL (VALVE) PROLAPSE: ICD-10-CM

## 2025-01-23 DIAGNOSIS — M17.12 UNILATERAL PRIMARY OSTEOARTHRITIS, LEFT KNEE: ICD-10-CM

## 2025-01-23 DIAGNOSIS — E78.5 HYPERLIPIDEMIA, UNSPECIFIED: ICD-10-CM

## 2025-01-23 DIAGNOSIS — Z96.651 PRESENCE OF RIGHT ARTIFICIAL KNEE JOINT: ICD-10-CM

## 2025-01-29 ENCOUNTER — APPOINTMENT (OUTPATIENT)
Dept: ORTHOPEDIC SURGERY | Facility: CLINIC | Age: 65
End: 2025-01-29
Payer: OTHER MISCELLANEOUS

## 2025-01-29 VITALS — WEIGHT: 230 LBS | BODY MASS INDEX: 36.1 KG/M2 | HEIGHT: 67 IN

## 2025-01-29 DIAGNOSIS — Z96.652 PRESENCE OF LEFT ARTIFICIAL KNEE JOINT: ICD-10-CM

## 2025-01-29 PROCEDURE — 99024 POSTOP FOLLOW-UP VISIT: CPT

## 2025-01-29 PROCEDURE — 73562 X-RAY EXAM OF KNEE 3: CPT | Mod: LT

## 2025-02-18 ENCOUNTER — APPOINTMENT (OUTPATIENT)
Dept: ORTHOPEDIC SURGERY | Facility: CLINIC | Age: 65
End: 2025-02-18
Payer: OTHER MISCELLANEOUS

## 2025-02-18 DIAGNOSIS — Z96.651 PRESENCE OF RIGHT ARTIFICIAL KNEE JOINT: ICD-10-CM

## 2025-02-18 DIAGNOSIS — Z96.652 PRESENCE OF LEFT ARTIFICIAL KNEE JOINT: ICD-10-CM

## 2025-02-18 DIAGNOSIS — M65.90 UNSPECIFIED SYNOVITIS AND TENOSYNOVITIS, UNSPECIFIED SITE: ICD-10-CM

## 2025-02-18 DIAGNOSIS — M23.92 UNSPECIFIED INTERNAL DERANGEMENT OF LEFT KNEE: ICD-10-CM

## 2025-02-18 PROCEDURE — 99213 OFFICE O/P EST LOW 20 MIN: CPT | Mod: 24

## 2025-02-20 PROBLEM — M65.90 SYNOVITIS: Status: ACTIVE | Noted: 2022-04-19

## 2025-04-01 ENCOUNTER — APPOINTMENT (OUTPATIENT)
Dept: ORTHOPEDIC SURGERY | Facility: CLINIC | Age: 65
End: 2025-04-01
Payer: OTHER MISCELLANEOUS

## 2025-04-01 VITALS — WEIGHT: 230 LBS | BODY MASS INDEX: 36.1 KG/M2 | HEIGHT: 67 IN

## 2025-04-01 DIAGNOSIS — Z96.652 PRESENCE OF LEFT ARTIFICIAL KNEE JOINT: ICD-10-CM

## 2025-04-01 PROCEDURE — 73562 X-RAY EXAM OF KNEE 3: CPT | Mod: LT

## 2025-04-01 PROCEDURE — 99024 POSTOP FOLLOW-UP VISIT: CPT

## 2025-04-23 NOTE — PHYSICAL THERAPY INITIAL EVALUATION ADULT - GAIT DEVIATIONS NOTED, PT EVAL
Follow Dr Guillen's orders
increased time in double stance/decreased step length/decreased stride length

## 2025-05-27 ENCOUNTER — APPOINTMENT (OUTPATIENT)
Dept: ORTHOPEDIC SURGERY | Facility: CLINIC | Age: 65
End: 2025-05-27
Payer: OTHER MISCELLANEOUS

## 2025-05-27 DIAGNOSIS — M65.90 UNSPECIFIED SYNOVITIS AND TENOSYNOVITIS, UNSPECIFIED SITE: ICD-10-CM

## 2025-05-27 DIAGNOSIS — Z96.652 PRESENCE OF LEFT ARTIFICIAL KNEE JOINT: ICD-10-CM

## 2025-05-27 PROCEDURE — 99213 OFFICE O/P EST LOW 20 MIN: CPT

## 2025-09-09 ENCOUNTER — APPOINTMENT (OUTPATIENT)
Dept: ORTHOPEDIC SURGERY | Facility: CLINIC | Age: 65
End: 2025-09-09

## 2025-09-09 PROCEDURE — 73562 X-RAY EXAM OF KNEE 3: CPT | Mod: 50

## 2025-09-09 PROCEDURE — 99213 OFFICE O/P EST LOW 20 MIN: CPT

## (undated) DEVICE — GLV 7 PROTEXIS (BLUE)

## (undated) DEVICE — CRYO/CUFF GRAVITY COOLER KNEE LARGE

## (undated) DEVICE — DRSG COBAN 6"

## (undated) DEVICE — ZIMMER PULSAVAC PLUS FAN KIT

## (undated) DEVICE — DRSG XEROFORM 5 X 9"

## (undated) DEVICE — GLV 7.5 PROTEXIS (BLUE)

## (undated) DEVICE — SUCTION TIP KAMVAC MINI

## (undated) DEVICE — SUT VICRYL 0 36" CT-1 UNDYED

## (undated) DEVICE — MAKO DRAPE KIT

## (undated) DEVICE — SAW BLADE STRYKER SAGITTAL EXTRA WIDE THIN SHORT

## (undated) DEVICE — SUT VICRYL 1 36" CTX UNDYED

## (undated) DEVICE — DRAPE GYN IRRIGATION POUCH 19 X 23"

## (undated) DEVICE — MIXER BONE CEMENT EVAC III

## (undated) DEVICE — MAKO CHECKPOINT KIT FEMORAL / TIBIAL

## (undated) DEVICE — SOL IRR BAG NS 0.9% 3000ML

## (undated) DEVICE — FRA-ESU BOVIE FORCE FX F3B25828A: Type: DURABLE MEDICAL EQUIPMENT

## (undated) DEVICE — WARMING BLANKET UPPER ADULT

## (undated) DEVICE — SYR ASEPTO

## (undated) DEVICE — TOURNIQUET ESMARK 6"

## (undated) DEVICE — SUT VICRYL 2-0 27" CT-2 UNDYED

## (undated) DEVICE — VENODYNE/SCD SLEEVE CALF MEDIUM

## (undated) DEVICE — GLV 7.5 PROTEXIS (WHITE)

## (undated) DEVICE — MAKO BLADE NARROW

## (undated) DEVICE — DRSG TEGADERM 2.5 X 3"

## (undated) DEVICE — MAKO BLADE STANDARD

## (undated) DEVICE — MAKO VIZADISC KNEE TRACKING KIT

## (undated) DEVICE — POSITIONER FOAM BUMP FLAT TOP 10X6X4" LRG

## (undated) DEVICE — HOOD T5 PEELAWAY

## (undated) DEVICE — SUT SURGICAL SKIN CLOSURE ZIP 24

## (undated) DEVICE — DRSG PICO NPWT 4X12"

## (undated) DEVICE — SUCTION YANKAUER TAPERED BULBOUS NO VENT

## (undated) DEVICE — SYR LUER LOK 50CC

## (undated) DEVICE — MEDICATION LABELS W MARKER

## (undated) DEVICE — GLV 7 PROTEXIS (WHITE)

## (undated) DEVICE — DRSG TEGADERM 4 X 4.75"